# Patient Record
Sex: FEMALE | Race: WHITE | NOT HISPANIC OR LATINO | Employment: OTHER | URBAN - METROPOLITAN AREA
[De-identification: names, ages, dates, MRNs, and addresses within clinical notes are randomized per-mention and may not be internally consistent; named-entity substitution may affect disease eponyms.]

---

## 2021-08-04 ENCOUNTER — OFFICE VISIT (OUTPATIENT)
Dept: FAMILY MEDICINE | Facility: CLINIC | Age: 76
End: 2021-08-04
Payer: MEDICARE

## 2021-08-04 VITALS
BODY MASS INDEX: 37.92 KG/M2 | HEIGHT: 63 IN | RESPIRATION RATE: 19 BRPM | SYSTOLIC BLOOD PRESSURE: 146 MMHG | DIASTOLIC BLOOD PRESSURE: 92 MMHG | OXYGEN SATURATION: 95 % | WEIGHT: 214 LBS | HEART RATE: 76 BPM

## 2021-08-04 DIAGNOSIS — I25.10 CORONARY ARTERY DISEASE INVOLVING NATIVE HEART WITHOUT ANGINA PECTORIS, UNSPECIFIED VESSEL OR LESION TYPE: ICD-10-CM

## 2021-08-04 DIAGNOSIS — Z98.890 HX OF MELANOMA EXCISION: ICD-10-CM

## 2021-08-04 DIAGNOSIS — I50.30 HEART FAILURE WITH PRESERVED EJECTION FRACTION, NYHA CLASS IV (H): ICD-10-CM

## 2021-08-04 DIAGNOSIS — Z85.820 HX OF MELANOMA EXCISION: ICD-10-CM

## 2021-08-04 DIAGNOSIS — Z00.00 ENCOUNTER FOR MEDICARE ANNUAL WELLNESS EXAM: Primary | ICD-10-CM

## 2021-08-04 DIAGNOSIS — Z87.19 HISTORY OF DIVERTICULITIS: ICD-10-CM

## 2021-08-04 DIAGNOSIS — K59.09 CHRONIC CONSTIPATION: ICD-10-CM

## 2021-08-04 DIAGNOSIS — Z13.820 SCREENING FOR OSTEOPOROSIS: ICD-10-CM

## 2021-08-04 DIAGNOSIS — L40.9 PSORIASIS: ICD-10-CM

## 2021-08-04 DIAGNOSIS — N39.41 URGENCY INCONTINENCE: ICD-10-CM

## 2021-08-04 DIAGNOSIS — E66.01 MORBID OBESITY (H): ICD-10-CM

## 2021-08-04 DIAGNOSIS — R01.1 HEART MURMUR: ICD-10-CM

## 2021-08-04 DIAGNOSIS — E78.5 HYPERLIPIDEMIA LDL GOAL <100: ICD-10-CM

## 2021-08-04 DIAGNOSIS — Z12.31 ENCOUNTER FOR SCREENING MAMMOGRAM FOR BREAST CANCER: ICD-10-CM

## 2021-08-04 DIAGNOSIS — E03.9 ACQUIRED HYPOTHYROIDISM: ICD-10-CM

## 2021-08-04 DIAGNOSIS — R82.90 NONSPECIFIC FINDING ON EXAMINATION OF URINE: ICD-10-CM

## 2021-08-04 DIAGNOSIS — I10 HTN, GOAL BELOW 140/90: ICD-10-CM

## 2021-08-04 DIAGNOSIS — N28.89 LEFT RENAL MASS: ICD-10-CM

## 2021-08-04 LAB
ALBUMIN SERPL-MCNC: 3.8 G/DL (ref 3.4–5)
ALBUMIN UR-MCNC: NEGATIVE MG/DL
ALP SERPL-CCNC: 101 U/L (ref 40–150)
ALT SERPL W P-5'-P-CCNC: 37 U/L (ref 0–50)
ANION GAP SERPL CALCULATED.3IONS-SCNC: <1 MMOL/L (ref 3–14)
APPEARANCE UR: CLEAR
AST SERPL W P-5'-P-CCNC: 21 U/L (ref 0–45)
BILIRUB SERPL-MCNC: 0.5 MG/DL (ref 0.2–1.3)
BILIRUB UR QL STRIP: NEGATIVE
BUN SERPL-MCNC: 15 MG/DL (ref 7–30)
CALCIUM SERPL-MCNC: 9.4 MG/DL (ref 8.5–10.1)
CHLORIDE BLD-SCNC: 105 MMOL/L (ref 94–109)
CHOLEST SERPL-MCNC: 267 MG/DL
CO2 SERPL-SCNC: 34 MMOL/L (ref 20–32)
COLOR UR AUTO: YELLOW
CREAT SERPL-MCNC: 0.69 MG/DL (ref 0.52–1.04)
FASTING STATUS PATIENT QL REPORTED: YES
GFR SERPL CREATININE-BSD FRML MDRD: 84 ML/MIN/1.73M2
GLUCOSE BLD-MCNC: 112 MG/DL (ref 70–99)
GLUCOSE UR STRIP-MCNC: NEGATIVE MG/DL
HDLC SERPL-MCNC: 73 MG/DL
HGB UR QL STRIP: NEGATIVE
KETONES UR STRIP-MCNC: NEGATIVE MG/DL
LDLC SERPL CALC-MCNC: 171 MG/DL
LEUKOCYTE ESTERASE UR QL STRIP: ABNORMAL
NITRATE UR QL: NEGATIVE
NONHDLC SERPL-MCNC: 194 MG/DL
PH UR STRIP: 7 [PH] (ref 5–7)
POTASSIUM BLD-SCNC: 4.7 MMOL/L (ref 3.4–5.3)
PROT SERPL-MCNC: 7.7 G/DL (ref 6.8–8.8)
RBC #/AREA URNS AUTO: ABNORMAL /HPF
SODIUM SERPL-SCNC: 138 MMOL/L (ref 133–144)
SP GR UR STRIP: 1.01 (ref 1–1.03)
SQUAMOUS #/AREA URNS AUTO: ABNORMAL /LPF
TRIGL SERPL-MCNC: 113 MG/DL
TSH SERPL DL<=0.005 MIU/L-ACNC: 2.46 MU/L (ref 0.4–4)
UROBILINOGEN UR STRIP-ACNC: 0.2 E.U./DL
WBC #/AREA URNS AUTO: ABNORMAL /HPF

## 2021-08-04 PROCEDURE — 80061 LIPID PANEL: CPT | Performed by: FAMILY MEDICINE

## 2021-08-04 PROCEDURE — 80053 COMPREHEN METABOLIC PANEL: CPT | Performed by: FAMILY MEDICINE

## 2021-08-04 PROCEDURE — 84443 ASSAY THYROID STIM HORMONE: CPT | Performed by: FAMILY MEDICINE

## 2021-08-04 PROCEDURE — 99387 INIT PM E/M NEW PAT 65+ YRS: CPT | Performed by: FAMILY MEDICINE

## 2021-08-04 PROCEDURE — 36415 COLL VENOUS BLD VENIPUNCTURE: CPT | Performed by: FAMILY MEDICINE

## 2021-08-04 PROCEDURE — 87086 URINE CULTURE/COLONY COUNT: CPT | Performed by: FAMILY MEDICINE

## 2021-08-04 PROCEDURE — 81001 URINALYSIS AUTO W/SCOPE: CPT | Performed by: FAMILY MEDICINE

## 2021-08-04 RX ORDER — GABAPENTIN 300 MG/1
300 CAPSULE ORAL PRN
COMMUNITY
Start: 2020-03-25

## 2021-08-04 RX ORDER — ATORVASTATIN CALCIUM 40 MG/1
40 TABLET, FILM COATED ORAL DAILY
COMMUNITY
Start: 2021-05-03 | End: 2021-08-09

## 2021-08-04 RX ORDER — VIT C/E/CUPERIC/ZINC/LUTEIN 226-90-0.8
1 CAPSULE ORAL
COMMUNITY

## 2021-08-04 RX ORDER — METOPROLOL SUCCINATE 25 MG/1
25 TABLET, EXTENDED RELEASE ORAL DAILY
Qty: 90 TABLET | Refills: 1 | Status: SHIPPED | OUTPATIENT
Start: 2021-08-04 | End: 2022-12-07

## 2021-08-04 RX ORDER — NITROGLYCERIN 0.4 MG/1
TABLET SUBLINGUAL PRN
COMMUNITY
Start: 2020-03-30 | End: 2021-09-17

## 2021-08-04 RX ORDER — LEVOTHYROXINE SODIUM 50 UG/1
50 TABLET ORAL DAILY
COMMUNITY
Start: 2021-02-18 | End: 2021-10-07

## 2021-08-04 RX ORDER — SPIRONOLACTONE 25 MG
2 TABLET ORAL
COMMUNITY

## 2021-08-04 ASSESSMENT — ENCOUNTER SYMPTOMS
DIZZINESS: 0
DIARRHEA: 0
HEMATOCHEZIA: 0
ARTHRALGIAS: 1
CHILLS: 0
PARESTHESIAS: 1
DYSURIA: 0
CONSTIPATION: 0
FEVER: 0
HEARTBURN: 0
MYALGIAS: 1
SHORTNESS OF BREATH: 1
COUGH: 1
FREQUENCY: 0
PALPITATIONS: 0
SORE THROAT: 0
WEAKNESS: 1
NAUSEA: 0
BREAST MASS: 0
JOINT SWELLING: 0
EYE PAIN: 0
ABDOMINAL PAIN: 1
HEADACHES: 1
NERVOUS/ANXIOUS: 0
HEMATURIA: 0

## 2021-08-04 ASSESSMENT — PAIN SCALES - GENERAL: PAINLEVEL: SEVERE PAIN (6)

## 2021-08-04 ASSESSMENT — ACTIVITIES OF DAILY LIVING (ADL): CURRENT_FUNCTION: NO ASSISTANCE NEEDED

## 2021-08-04 ASSESSMENT — MIFFLIN-ST. JEOR: SCORE: 1422.83

## 2021-08-04 NOTE — PROGRESS NOTES
SUBJECTIVE:   Emerald Kulkarni is a 76 year old female who presents for Preventive Visit.  Patient has been advised of split billing requirements and indicates understanding: Yes   Are you in the first 12 months of your Medicare coverage?  No    HPI  Do you feel safe in your environment? Yes    Have you ever done Advance Care Planning? (For example, a Health Directive, POLST, or a discussion with a medical provider or your loved ones about your wishes): No, advance care planning information given to patient to review.  Patient declined advance care planning discussion at this time.   Concerns:  1. Heart: Regular nitro, indigestion, stent placed (6 yrs ago)  2. Eye doctor: found to have a high BP  3. HTN: might   Mass in upper jaw: been xrayed, pushing into Rt sinuses.  Incontinence: at the beginning to empty/urgeny x 6 mo    List of medical issues per patient scanned unto chart (  media tab)    Fall risk: 2 times, has neuropathy.  Back pain; uses to be 5'5' now 5' 3''. Has neck and shoulder and mid upper back,  hip replacements.     Living arrangement: Lives in house   Ambulation; Lot of walking uses 2 canes; otherwise has one can always, has walker as well. Due to Neurpathy  Vision: Has MD, uses glasses: follwing with eye  Hearing: Good  Bladder control: Incontinence, dribbles.jerome if does not have a BM for long.  Memory: good; forgets  ADLs: Damiánband helps, she does the cooking while seated.   Driving: Not driven in a year;  does most of it  Colonoscopy: had one last last year:    Breast cancer: mother, sister, uncle (bro to mother);   Hydrochlorothiazide 25  levo 50  lipitor 40  Lutein; 5mg  Ascorbic 500 mg twice daily  Asa 81  ntg omeprazole    Cognitive Screening   1) Repeat 3 items (Leader, Season, Table)    2) Clock draw: NORMAL  3) 3 item recall: Recalls 3 objects  Results: 3 items recalled: COGNITIVE IMPAIRMENT LESS LIKELY    Mini-CogTM Copyright S Shaquille. Licensed by the author for use in [facility]; reprinted with permission (soob@[facility]). All rights reserved.      Do you have sleep apnea, excessive snoring or daytime drowsiness?: no daytime drowsiness     Reviewed and updated as needed this visit by clinical staff  Tobacco  Allergies  Meds   Med Hx  Surg Hx  Fam Hx  Soc Hx        Reviewed and updated as needed this visit by Provider                Social History     Tobacco Use     Smoking status: Never Smoker     Smokeless tobacco: Never Used   Substance Use Topics     Alcohol use: Not on file     If you drink alcohol do you typically have >3 drinks per day or >7 drinks per week? No    Alcohol Use 8/4/2021   Prescreen: >3 drinks/day or >7 drinks/week? Not Applicable   Prescreen: >3 drinks/day or >7 drinks/week? -   No flowsheet data found.    PROBLEMS TO ADD ON...    1. HTN; uncontrolled on hydrochlorothiazide 25 mg. Not taking Metoprolol 25 mg 24 hr  2. Hypothyroidism: om Levothyroxine 50 mcg daily.  3. Hyperlipidemia: On Lipitor 40 mg daily.  4. CAD: on NTG prn; follows with cardiology  5. Macular Degeneration: following with ophthalmology  6.     Current providers sharing in care for this patient include:   Patient Care Team:  No Ref-Primary, Physician as PCP - General    The following health maintenance items are reviewed in Epic and correct as of today:  Health Maintenance Due   Topic Date Due     DEXA  Never done     ANNUAL REVIEW OF HM ORDERS  Never done     ADVANCE CARE PLANNING  Never done     COVID-19 Vaccine (1) Never done     HEPATITIS C SCREENING  Never done     DTAP/TDAP/TD IMMUNIZATION (1 - Tdap) Never done     LIPID  Never done     ZOSTER IMMUNIZATION (1 of 2) Never done     FALL RISK ASSESSMENT  Never done     Pneumococcal Vaccine: 65+ Years (1 of 1 - PPSV23) Never done     There is no problem list on file for this patient.    History reviewed. No pertinent surgical history.    Social History     Tobacco Use     Smoking status: Never Smoker     Smokeless tobacco: Never  "Used   Substance Use Topics     Alcohol use: Not on file     History reviewed. No pertinent family history.      Pneumonia Vaccine:Adults age 65+ who received Pneumovax (PPSV23) at 65 years or older: Should be given PCV13 > 1 year after their most recent PPSV23  Mammogram Screening: Mammogram Screening - Alternate mammogram schedule due to breast cancer history mother and brother    Mammogram Screening - Patient over age 75, has elected to continue with screening.  Pertinent mammograms are reviewed under the imaging tab.    Review of Systems  Constitutional, HEENT, cardiovascular, pulmonary, gi and gu systems are negative, except as otherwise noted.    OBJECTIVE:   BP (!) 171/87 (BP Location: Right arm, Cuff Size: Adult Large)   Pulse 76   Resp 19   Ht 1.605 m (5' 3.19\")   Wt 97.1 kg (214 lb)   SpO2 95%   BMI 37.68 kg/m   Estimated body mass index is 37.68 kg/m  as calculated from the following:    Height as of this encounter: 1.605 m (5' 3.19\").    Weight as of this encounter: 97.1 kg (214 lb).  Physical Exam  GENERAL APPEARANCE: healthy, alert and no distress  EYES: Eyes grossly normal to inspection, PERRL and conjunctivae and sclerae normal  HENT: ear canals and TM's normal, nose and mouth without ulcers or lesions, oropharynx clear and oral mucous membranes moist  NECK: no adenopathy, and thyroid normal to palpation  RESP: lungs clear to auscultation - no rales, rhonchi or wheezes  BREAST: Deferred  CV: regular rate and rhythm, systolic murmur, click or rub, no peripheral edema   ABDOMEN: soft, obese, nontender, no masses and bowel sounds normal  MS: slow unsteady gait  SKIN: no suspicious lesions or rashes  NEURO: Normal strength and tone, mentation intact and speech normal  PSYCH: mentation appears normal and affect normal/bright    Diagnostic Test Results:  Results for orders placed or performed in visit on 08/04/21   UA reflex to Microscopic - lab collect     Status: Abnormal   Result Value Ref Range "    Color Urine Yellow Colorless, Straw, Light Yellow, Yellow    Appearance Urine Clear Clear    Glucose Urine Negative Negative mg/dL    Bilirubin Urine Negative Negative    Ketones Urine Negative Negative mg/dL    Specific Gravity Urine 1.015 1.003 - 1.035    Blood Urine Negative Negative    pH Urine 7.0 5.0 - 7.0    Protein Albumin Urine Negative Negative mg/dL    Urobilinogen Urine 0.2 0.2, 1.0 E.U./dL    Nitrite Urine Negative Negative    Leukocyte Esterase Urine Small (A) Negative   TSH with free T4 reflex     Status: Normal   Result Value Ref Range    TSH 2.46 0.40 - 4.00 mU/L   Comprehensive metabolic panel (BMP + Alb, Alk Phos, ALT, AST, Total. Bili, TP)     Status: Abnormal   Result Value Ref Range    Sodium 138 133 - 144 mmol/L    Potassium 4.7 3.4 - 5.3 mmol/L    Chloride 105 94 - 109 mmol/L    Carbon Dioxide (CO2) 34 (H) 20 - 32 mmol/L    Anion Gap <1 (L) 3 - 14 mmol/L    Urea Nitrogen 15 7 - 30 mg/dL    Creatinine 0.69 0.52 - 1.04 mg/dL    Calcium 9.4 8.5 - 10.1 mg/dL    Glucose 112 (H) 70 - 99 mg/dL    Alkaline Phosphatase 101 40 - 150 U/L    AST 21 0 - 45 U/L    ALT 37 0 - 50 U/L    Protein Total 7.7 6.8 - 8.8 g/dL    Albumin 3.8 3.4 - 5.0 g/dL    Bilirubin Total 0.5 0.2 - 1.3 mg/dL    GFR Estimate 84 >60 mL/min/1.73m2   Lipid Profile (Chol, Trig, HDL, LDL calc)     Status: Abnormal   Result Value Ref Range    Cholesterol 267 (H) <200 mg/dL    Triglycerides 113 <150 mg/dL    Direct Measure HDL 73 >=50 mg/dL    LDL Cholesterol Calculated 171 (H) <=100 mg/dL    Non HDL Cholesterol 194 (H) <130 mg/dL    Patient Fasting > 8hrs? Yes    Urine Microscopic Exam     Status: Abnormal   Result Value Ref Range    RBC Urine 5-10 (A) 0-2 /HPF /HPF    WBC Urine 10-25 (A) 0-5 /HPF /HPF    Squamous Epithelials Urine Few (A) None Seen /LPF   Urine Culture     Status: None    Specimen: Urine, Midstream   Result Value Ref Range    Culture No Growth          ASSESSMENT / PLAN:   Emerald was seen today for  physical.    Diagnoses and all orders for this visit:    Encounter for Medicare annual wellness exam  -     *MA Screening Digital Bilateral; Future  -     DX Hip/Pelvis/Spine; Future    HTN, goal below 140/90     Not at goal. Not been taking Metoprolol; will restart and recheck BP in 1-2 weeks  -     Comprehensive metabolic panel (BMP + Alb, Alk Phos, ALT, AST, Total. Bili, TP); Future  -     metoprolol succinate ER (TOPROL-XL) 25 MG 24 hr tablet; Take 1 tablet (25 mg) by mouth daily  -     Comprehensive metabolic panel (BMP + Alb, Alk Phos, ALT, AST, Total. Bili, TP)    Hyperlipidemia LDL goal <100    Cholesterol still very high; plan to increase dose of Lipitor to 80 mg  -     Lipid Profile (Chol, Trig, HDL, LDL calc); Future  -     Lipid Profile (Chol, Trig, HDL, LDL calc)    Coronary artery disease involving native heart without angina pectoris, unspecified vessel or lesion type    Acquired hypothyroidism  -     TSH with free T4 reflex; Future  -     TSH with free T4 reflex    Urgency incontinence  -     UA reflex to Microscopic - lab collect; Future  -     UA reflex to Microscopic - lab collect  -     Urine Microscopic Exam    Screening for osteoporosis  -     DX Hip/Pelvis/Spine; Future    Encounter for screening mammogram for breast cancer  -     *MA Screening Digital Bilateral; Future  -     Comprehensive metabolic panel (BMP + Alb, Alk Phos, ALT, AST, Total. Bili, TP); Future  -     Comprehensive metabolic panel (BMP + Alb, Alk Phos, ALT, AST, Total. Bili, TP)    Psoriasis    Hx of melanoma excision    Chronic constipation    History of diverticulitis    Left renal mass       -  Imaging surveillance    Nonspecific finding on examination of urine  -     Urine Culture; Future  -     Urine Culture    Heart murmur: Benign ?AS  Heart failure with preserved ejection fraction, NYHA class IV (H)     -   Following with cardiology    Morbid obesity (H)    Patient has been advised of split billing requirements and  "indicates understanding: Yes  COUNSELING:  Reviewed preventive health counseling, as reflected in patient instructions       Regular exercise       Healthy diet/nutrition       Bladder control       Fall risk prevention       Osteoporosis prevention/bone health    Estimated body mass index is 37.68 kg/m  as calculated from the following:    Height as of this encounter: 1.605 m (5' 3.19\").    Weight as of this encounter: 97.1 kg (214 lb).    Weight management plan: Discussed healthy diet and exercise guidelines    She reports that she has never smoked. She has never used smokeless tobacco.      Appropriate preventive services were discussed with this patient, including applicable screening as appropriate for cardiovascular disease, diabetes, osteopenia/osteoporosis, and glaucoma.  As appropriate for age/gender, discussed screening for colorectal cancer, prostate cancer, breast cancer, and cervical cancer. Checklist reviewing preventive services available has been given to the patient.    Reviewed patients plan of care and provided an AVS. The Basic Care Plan (routine screening as documented in Health Maintenance) for Emerald meets the Care Plan requirement. This Care Plan has been established and reviewed with the Patient and spouse.    Counseling Resources:  ATP IV Guidelines  Pooled Cohorts Equation Calculator  Breast Cancer Risk Calculator  Breast Cancer: Medication to Reduce Risk  FRAX Risk Assessment  ICSI Preventive Guidelines  Dietary Guidelines for Americans, 2010  [employer]'s MyPlate  ASA Prophylaxis  Lung CA Screening    Chan Hanks MD  [facility]    Identified Health Risks:  "

## 2021-08-05 LAB — BACTERIA UR CULT: NO GROWTH

## 2021-08-06 ENCOUNTER — TELEPHONE (OUTPATIENT)
Dept: FAMILY MEDICINE | Facility: CLINIC | Age: 76
End: 2021-08-06

## 2021-08-06 NOTE — TELEPHONE ENCOUNTER
Patient returned call to Dr. Almonte about Lipid results  Patient okay with taking medication. Pharmacy pended.  Patient available at [phone]  Maria Eugenia Franco RN on 8/6/2021 at 4:56 PM

## 2021-08-08 PROBLEM — E66.01 MORBID OBESITY (H): Status: ACTIVE | Noted: 2021-08-08

## 2021-08-08 PROBLEM — I50.30 HEART FAILURE WITH PRESERVED EJECTION FRACTION, NYHA CLASS IV (H): Status: ACTIVE | Noted: 2021-08-08

## 2021-08-09 DIAGNOSIS — E78.5 HYPERLIPIDEMIA LDL GOAL <100: Primary | ICD-10-CM

## 2021-08-09 RX ORDER — ATORVASTATIN CALCIUM 40 MG/1
40 TABLET, FILM COATED ORAL DAILY
Qty: 90 TABLET | Refills: 1 | Status: SHIPPED | OUTPATIENT
Start: 2021-08-09 | End: 2021-11-26

## 2021-08-10 ENCOUNTER — MEDICAL CORRESPONDENCE (OUTPATIENT)
Dept: HEALTH INFORMATION MANAGEMENT | Facility: CLINIC | Age: 76
End: 2021-08-10
Payer: MEDICARE

## 2021-08-18 ENCOUNTER — ANCILLARY PROCEDURE (OUTPATIENT)
Dept: BONE DENSITY | Facility: CLINIC | Age: 76
End: 2021-08-18
Attending: FAMILY MEDICINE
Payer: MEDICARE

## 2021-08-18 ENCOUNTER — ANCILLARY PROCEDURE (OUTPATIENT)
Dept: MAMMOGRAPHY | Facility: CLINIC | Age: 76
End: 2021-08-18
Attending: FAMILY MEDICINE
Payer: MEDICARE

## 2021-08-18 DIAGNOSIS — Z78.0 ASYMPTOMATIC POSTMENOPAUSAL STATUS: ICD-10-CM

## 2021-08-18 DIAGNOSIS — Z00.00 ENCOUNTER FOR MEDICARE ANNUAL WELLNESS EXAM: ICD-10-CM

## 2021-08-18 DIAGNOSIS — Z12.31 ENCOUNTER FOR SCREENING MAMMOGRAM FOR BREAST CANCER: ICD-10-CM

## 2021-08-18 DIAGNOSIS — Z13.820 SCREENING FOR OSTEOPOROSIS: ICD-10-CM

## 2021-08-18 PROCEDURE — 77080 DXA BONE DENSITY AXIAL: CPT | Mod: XU | Performed by: INTERNAL MEDICINE

## 2021-08-18 PROCEDURE — 77067 SCR MAMMO BI INCL CAD: CPT | Mod: TC | Performed by: RADIOLOGY

## 2021-08-18 PROCEDURE — 77063 BREAST TOMOSYNTHESIS BI: CPT | Mod: TC | Performed by: RADIOLOGY

## 2021-08-18 PROCEDURE — 77081 DXA BONE DENSITY APPENDICULR: CPT | Performed by: INTERNAL MEDICINE

## 2021-08-19 ENCOUNTER — TRANSFERRED RECORDS (OUTPATIENT)
Dept: HEALTH INFORMATION MANAGEMENT | Facility: CLINIC | Age: 76
End: 2021-08-19

## 2021-08-24 PROBLEM — N28.1 COMPLEX RENAL CYST: Status: ACTIVE | Noted: 2021-08-24

## 2021-09-01 ENCOUNTER — OFFICE VISIT (OUTPATIENT)
Dept: FAMILY MEDICINE | Facility: CLINIC | Age: 76
End: 2021-09-01
Payer: MEDICARE

## 2021-09-01 VITALS
OXYGEN SATURATION: 94 % | HEART RATE: 90 BPM | TEMPERATURE: 98.3 F | DIASTOLIC BLOOD PRESSURE: 86 MMHG | BODY MASS INDEX: 36.57 KG/M2 | SYSTOLIC BLOOD PRESSURE: 142 MMHG | HEIGHT: 64 IN | WEIGHT: 214.2 LBS | RESPIRATION RATE: 17 BRPM

## 2021-09-01 DIAGNOSIS — R32 URINARY INCONTINENCE, UNSPECIFIED TYPE: ICD-10-CM

## 2021-09-01 DIAGNOSIS — I10 HTN, GOAL BELOW 140/90: Primary | ICD-10-CM

## 2021-09-01 DIAGNOSIS — M81.0 AGE RELATED OSTEOPOROSIS, UNSPECIFIED PATHOLOGICAL FRACTURE PRESENCE: ICD-10-CM

## 2021-09-01 PROCEDURE — 99214 OFFICE O/P EST MOD 30 MIN: CPT | Performed by: FAMILY MEDICINE

## 2021-09-01 ASSESSMENT — MIFFLIN-ST. JEOR: SCORE: 1429.98

## 2021-09-01 ASSESSMENT — PAIN SCALES - GENERAL: PAINLEVEL: NO PAIN (0)

## 2021-09-01 NOTE — PROGRESS NOTES
Assessment & Plan     HTN, goal below 140/90    BP borderline high, taking Metoprolol 25 mg and Lisinopril 10 mg. Discussed sodium restriction, maintaining ideal body weight and regular exercise program as physiologic means to achieve blood pressure control. Discussed adjusting medication; but she would like to lose some weight and see if that helps. Will send in BP reading and if persistently elevated will increase Lisinopril to 20, 30 or 40 mg till BP gets to gaol.    Age related osteoporosis, unspecified pathological fracture presence    -  Reviwed results from Dexa scan; which showed osteoporosis with severe ddd; discussed risk of fracture and treatments for osteoporosis; with fosamax or prolia; concerned with side effects would like to do Vit D and calcium supplements only at this time    Urinary incontinence, unspecified type    She is interested in pelvic floor exercises with PT and been told that this can help; referral for pelvic floor exercises given,.    - [facility]; Future    BMI 37.0-37.9, adult   -  Counseled to make better food choices, exercise as tolerated, and lose weight. 6}      Return in about 3 months (around 12/1/2021) for Wellness follow up.    Chan Hanks MD  [facility] GAGNA Corbin is a 76 year old who presents for the following health issues   HPI   Chief Complaint   Patient presents with     Follow Up     last seen 8/4/2021     Health Maintenance     Flu Shot, Fall Risk,  Zoster, Hepc Screening, Covid19 vaccine      Stomach upset:   After took a cookie with coffee; had bile coming up; but this has subsided    HTN:  BP borderline high.    Exercse and wt loss  BP Readings from Last 3 Encounters:   09/01/21 (!) 142/86   08/04/21 (!) 146/92     Bladder Dribbling: she was pescribed a medication.    Healthcare Maintenance:    Will follow up    Review of Systems   Constitutional, HEENT, cardiovascular, pulmonary and gi systems are  "negative, except as otherwise noted.      Objective    BP (!) 142/86   Pulse 90   Temp 98.3  F (36.8  C) (Oral)   Resp 17   Ht 1.615 m (5' 3.58\")   Wt 97.2 kg (214 lb 3.2 oz)   SpO2 94%   BMI 37.25 kg/m    Body mass index is 37.25 kg/m .  Physical Exam   GENERAL: healthy, alert and no distress  RESP: lungs clear to auscultation - no rales, rhonchi or wheezes  CV: regular rate and rhythm, no murmur, click or rub, no peripheral edema  MS: no gross musculoskeletal defects noted, no edema  PSYCH: mentation appears normal, affect normal/bright    "

## 2021-09-09 ENCOUNTER — TRANSFERRED RECORDS (OUTPATIENT)
Dept: HEALTH INFORMATION MANAGEMENT | Facility: CLINIC | Age: 76
End: 2021-09-09

## 2021-09-09 DIAGNOSIS — N28.89 RENAL MASS, LEFT: Primary | ICD-10-CM

## 2021-10-06 DIAGNOSIS — E03.9 ACQUIRED HYPOTHYROIDISM: Primary | ICD-10-CM

## 2021-10-07 RX ORDER — LEVOTHYROXINE SODIUM 50 UG/1
50 TABLET ORAL DAILY
Qty: 90 TABLET | Refills: 1 | Status: SHIPPED | OUTPATIENT
Start: 2021-10-07 | End: 2022-06-01

## 2021-11-23 DIAGNOSIS — E78.5 HYPERLIPIDEMIA LDL GOAL <100: ICD-10-CM

## 2021-11-26 RX ORDER — ATORVASTATIN CALCIUM 40 MG/1
40 TABLET, FILM COATED ORAL DAILY
Qty: 90 TABLET | Refills: 0 | Status: SHIPPED | OUTPATIENT
Start: 2021-11-26 | End: 2022-12-07

## 2022-05-31 DIAGNOSIS — E03.9 ACQUIRED HYPOTHYROIDISM: ICD-10-CM

## 2022-06-01 RX ORDER — LEVOTHYROXINE SODIUM 50 UG/1
TABLET ORAL
Qty: 90 TABLET | Refills: 0 | Status: SHIPPED | OUTPATIENT
Start: 2022-06-01 | End: 2022-08-23

## 2022-06-01 NOTE — TELEPHONE ENCOUNTER
"Requested Prescriptions   Signed Prescriptions Disp Refills    levothyroxine (SYNTHROID/LEVOTHROID) 50 MCG tablet 90 tablet 0     Sig: TAKE ONE TABLET BY MOUTH ONE TIME DAILY       Thyroid Protocol Passed - 5/31/2022  1:39 PM        Passed - Patient is 12 years or older        Passed - Recent (12 mo) or future (30 days) visit within the authorizing provider's specialty     Patient has had an office visit with the authorizing provider or a provider within the authorizing providers department within the previous 12 mos or has a future within next 30 days. See \"Patient Info\" tab in inbasket, or \"Choose Columns\" in Meds & Orders section of the refill encounter.              Passed - Medication is active on med list        Passed - Normal TSH on file in past 12 months     Recent Labs   Lab Test 08/04/21  1044   TSH 2.46              Passed - No active pregnancy on record     If patient is pregnant or has had a positive pregnancy test, please check TSH.          Passed - No positive pregnancy test in past 12 months     If patient is pregnant or has had a positive pregnancy test, please check TSH.             Thanks,  JORGE Cleaning  [facility]     "

## 2022-08-20 DIAGNOSIS — E03.9 ACQUIRED HYPOTHYROIDISM: ICD-10-CM

## 2022-08-23 RX ORDER — LEVOTHYROXINE SODIUM 50 UG/1
TABLET ORAL
Qty: 90 TABLET | Refills: 0 | Status: SHIPPED | OUTPATIENT
Start: 2022-08-23 | End: 2022-12-07

## 2022-08-23 NOTE — TELEPHONE ENCOUNTER
Medication levothyroxine (SYNTHROID/LEVOTHROID) is being filled for 1 time refill only due to:  Patient needs to be seen because it has been more than one year since last visit.  Please notify patient    Annamaria Velazco, RN, BSN

## 2022-08-26 NOTE — TELEPHONE ENCOUNTER
Called and spoke with patient. She was made aware of needing to scheduling for further refills. Declined to scheduled.    Vi Vasquez,      Approved Prescriptions     levothyroxine (SYNTHROID/LEVOTHROID) 50 MCG tablet         Sig: TAKE ONE TABLET BY MOUTH ONE TIME DAILY    Disp:  90 tablet    Refills:  0    Start: 8/23/2022    Class: E-Prescribe    Authorized by: Annamaria Velazco RN

## 2022-10-19 ENCOUNTER — DOCUMENTATION ONLY (OUTPATIENT)
Dept: LAB | Facility: CLINIC | Age: 77
End: 2022-10-19

## 2022-10-19 DIAGNOSIS — E03.9 ACQUIRED HYPOTHYROIDISM: Primary | ICD-10-CM

## 2022-10-20 ENCOUNTER — LAB (OUTPATIENT)
Dept: LAB | Facility: CLINIC | Age: 77
End: 2022-10-20
Payer: MEDICARE

## 2022-10-20 DIAGNOSIS — E03.9 ACQUIRED HYPOTHYROIDISM: ICD-10-CM

## 2022-10-20 LAB — TSH SERPL DL<=0.005 MIU/L-ACNC: 1.77 MU/L (ref 0.4–4)

## 2022-10-20 PROCEDURE — 84443 ASSAY THYROID STIM HORMONE: CPT

## 2022-10-20 PROCEDURE — 36415 COLL VENOUS BLD VENIPUNCTURE: CPT

## 2022-10-24 NOTE — PROGRESS NOTES
Called patient xavier yee answered and stated she is not up yet and he would leave a message to have her call back.   Thank you   L S

## 2022-10-27 ENCOUNTER — ANCILLARY PROCEDURE (OUTPATIENT)
Dept: MAMMOGRAPHY | Facility: CLINIC | Age: 77
End: 2022-10-27
Attending: FAMILY MEDICINE
Payer: MEDICARE

## 2022-10-27 DIAGNOSIS — Z12.31 VISIT FOR SCREENING MAMMOGRAM: ICD-10-CM

## 2022-10-27 PROCEDURE — 77067 SCR MAMMO BI INCL CAD: CPT | Mod: TC | Performed by: RADIOLOGY

## 2022-10-27 PROCEDURE — 77063 BREAST TOMOSYNTHESIS BI: CPT | Mod: TC | Performed by: RADIOLOGY

## 2022-12-07 ENCOUNTER — OFFICE VISIT (OUTPATIENT)
Dept: FAMILY MEDICINE | Facility: CLINIC | Age: 77
End: 2022-12-07
Payer: MEDICARE

## 2022-12-07 VITALS
TEMPERATURE: 98.8 F | BODY MASS INDEX: 37.53 KG/M2 | SYSTOLIC BLOOD PRESSURE: 160 MMHG | WEIGHT: 211.8 LBS | HEART RATE: 87 BPM | DIASTOLIC BLOOD PRESSURE: 98 MMHG | OXYGEN SATURATION: 97 % | HEIGHT: 63 IN

## 2022-12-07 DIAGNOSIS — E78.5 HYPERLIPIDEMIA LDL GOAL <100: ICD-10-CM

## 2022-12-07 DIAGNOSIS — L84 CALLUS OF FOOT: ICD-10-CM

## 2022-12-07 DIAGNOSIS — M20.41 HAMMER TOES, BILATERAL: ICD-10-CM

## 2022-12-07 DIAGNOSIS — E03.9 ACQUIRED HYPOTHYROIDISM: ICD-10-CM

## 2022-12-07 DIAGNOSIS — Z00.00 ENCOUNTER FOR MEDICARE ANNUAL WELLNESS EXAM: Primary | ICD-10-CM

## 2022-12-07 DIAGNOSIS — M20.42 HAMMER TOES, BILATERAL: ICD-10-CM

## 2022-12-07 DIAGNOSIS — I10 HTN, GOAL BELOW 140/90: ICD-10-CM

## 2022-12-07 LAB
ALBUMIN SERPL-MCNC: 3.8 G/DL (ref 3.4–5)
ALP SERPL-CCNC: 94 U/L (ref 40–150)
ALT SERPL W P-5'-P-CCNC: 33 U/L (ref 0–50)
ANION GAP SERPL CALCULATED.3IONS-SCNC: 4 MMOL/L (ref 3–14)
AST SERPL W P-5'-P-CCNC: 19 U/L (ref 0–45)
BILIRUB SERPL-MCNC: 0.4 MG/DL (ref 0.2–1.3)
BUN SERPL-MCNC: 13 MG/DL (ref 7–30)
CALCIUM SERPL-MCNC: 9.7 MG/DL (ref 8.5–10.1)
CHLORIDE BLD-SCNC: 103 MMOL/L (ref 94–109)
CHOLEST SERPL-MCNC: 239 MG/DL
CO2 SERPL-SCNC: 30 MMOL/L (ref 20–32)
CREAT SERPL-MCNC: 0.62 MG/DL (ref 0.52–1.04)
FASTING STATUS PATIENT QL REPORTED: YES
GFR SERPL CREATININE-BSD FRML MDRD: 90 ML/MIN/1.73M2
GLUCOSE BLD-MCNC: 113 MG/DL (ref 70–99)
HDLC SERPL-MCNC: 87 MG/DL
LDLC SERPL CALC-MCNC: 128 MG/DL
NONHDLC SERPL-MCNC: 152 MG/DL
POTASSIUM BLD-SCNC: 4.2 MMOL/L (ref 3.4–5.3)
PROT SERPL-MCNC: 8 G/DL (ref 6.8–8.8)
SODIUM SERPL-SCNC: 137 MMOL/L (ref 133–144)
TRIGL SERPL-MCNC: 118 MG/DL

## 2022-12-07 PROCEDURE — 80053 COMPREHEN METABOLIC PANEL: CPT | Performed by: FAMILY MEDICINE

## 2022-12-07 PROCEDURE — 99213 OFFICE O/P EST LOW 20 MIN: CPT | Mod: 25 | Performed by: FAMILY MEDICINE

## 2022-12-07 PROCEDURE — G0439 PPPS, SUBSEQ VISIT: HCPCS | Performed by: FAMILY MEDICINE

## 2022-12-07 PROCEDURE — 36415 COLL VENOUS BLD VENIPUNCTURE: CPT | Performed by: FAMILY MEDICINE

## 2022-12-07 PROCEDURE — 80061 LIPID PANEL: CPT | Performed by: FAMILY MEDICINE

## 2022-12-07 RX ORDER — ATORVASTATIN CALCIUM 40 MG/1
40 TABLET, FILM COATED ORAL DAILY
Qty: 90 TABLET | Refills: 3 | Status: SHIPPED | OUTPATIENT
Start: 2022-12-07 | End: 2023-07-17

## 2022-12-07 RX ORDER — LISINOPRIL 10 MG/1
10 TABLET ORAL DAILY
Qty: 90 TABLET | Refills: 1 | COMMUNITY
Start: 2022-12-07 | End: 2023-07-17

## 2022-12-07 RX ORDER — METOPROLOL SUCCINATE 25 MG/1
25 TABLET, EXTENDED RELEASE ORAL DAILY
Qty: 90 TABLET | Refills: 1 | COMMUNITY
Start: 2022-12-07 | End: 2023-07-17

## 2022-12-07 RX ORDER — LEVOTHYROXINE SODIUM 50 UG/1
50 TABLET ORAL DAILY
Qty: 90 TABLET | Refills: 3 | Status: SHIPPED | OUTPATIENT
Start: 2022-12-07 | End: 2024-02-07

## 2022-12-07 ASSESSMENT — ACTIVITIES OF DAILY LIVING (ADL): CURRENT_FUNCTION: NO ASSISTANCE NEEDED

## 2022-12-07 NOTE — PATIENT INSTRUCTIONS
Patient Education   Personalized Prevention Plan  You are due for the preventive services outlined below.  Your care team is available to assist you in scheduling these services.  If you have already completed any of these items, please share that information with your care team to update in your medical record.  Health Maintenance Due   Topic Date Due     ANNUAL REVIEW OF HM ORDERS  Never done     COVID-19 Vaccine (1) Never done     Hepatitis C Screening  Never done     Zoster (Shingles) Vaccine (1 of 2) Never done     Annual Wellness Visit  08/04/2022     Flu Vaccine (1) 09/01/2022       Exercise for a Healthier Heart  You may wonder how you can improve the health of your heart. If you re thinking about exercise, you re on the right track. You don t need to become an athlete. But you do need a certain amount of brisk exercise to help strengthen your heart. If you have been diagnosed with a heart condition, your healthcare provider may advise exercise to help stabilize your condition. To help make exercise a habit, choose safe, fun activities.      Exercise with a friend. When activity is fun, you're more likely to stick with it.   Before you start  Check with your healthcare provider before starting an exercise program. This is especially important if you have not been active for a while. It's also important if you have a long-term (chronic) health problem such as heart disease, diabetes, or obesity. Or if you are at high risk for having these problems.   Why exercise?  Exercising regularly offers many healthy rewards. It can help you do all of the following:     Improve your blood cholesterol level to help prevent further heart trouble    Lower your blood pressure to help prevent a stroke or heart attack    Control diabetes, or reduce your risk of getting this disease    Improve your heart and lung function    Reach and stay at a healthy weight    Make your muscles stronger so you can stay active    Prevent  falls and fractures by slowing the loss of bone mass (osteoporosis)    Manage stress better    Reduce your blood pressure    Improve your sense of self and your body image  Exercise tips      Ease into your routine. Set small goals. Then build on them. If you are not sure what your activity level should be, talk with your healthcare provider first before starting an exercise routine.    Exercise on most days. Aim for a total of 150 minutes (2 hours and 30 minutes) or more of moderate-intensity aerobic activity each week. Or 75 minutes (1 hour and 15 minutes) or more of vigorous-intensity aerobic activity each week. Or try for a combination of both. Moderate activity means that you breathe heavier and your heart rate increases but you can still talk. Think about doing 40 minutes of moderate exercise, 3 to 4 times a week. For best results, activity should last for about 40 minutes to lower blood pressure and cholesterol. It's OK to work up to the 40-minute period over time. Examples of moderate-intensity activity are walking 1 mile in 15 minutes. Or doing 30 to 45 minutes of yard work.    Step up your daily activity level.  Along with your exercise program, try being more active the whole day. Walk instead of drive. Or park further away so that you take more steps each day. Do more household tasks or yard work. You may not be able to meet the advised mount of physical activity. But doing some moderate- or vigorous-intensity aerobic activity can help reduce your risk for heart disease. Your healthcare provider can help you figure out what is best for you.    Choose 1 or more activities you enjoy.  Walking is one of the easiest things you can do. You can also try swimming, riding a bike, dancing, or taking an exercise class.    When to call your healthcare provider  Call your healthcare provider if you have any of these:     Chest pain or feel dizzy or lightheaded    Burning, tightness, pressure, or heaviness in your  chest, neck, shoulders, back, or arms    Abnormal shortness of breath    More joint or muscle pain    A very fast or irregular heartbeat (palpitations)  [employer] last reviewed this educational content on 7/1/2019 2000-2021 [employer]. All rights reserved. This information is not intended as a substitute for professional medical care. Always follow your healthcare professional's instructions.          Understanding USDA MyPlate  The USDA has guidelines to help you make healthy food choices. These are called MyPlate. MyPlate shows the food groups that make up healthy meals using the image of a place setting. Before you eat, think about the healthiest choices for what to put on your plate or in your cup or bowl. To learn more about building a healthy plate, visit www.choosemyplate.gov.    The food groups    Fruits. Any fruit or 100% fruit juice counts as part of the Fruit Group. Fruits may be fresh, canned, frozen, or dried, and may be whole, cut-up, or pureed. Make 1/2 of your plate fruits and vegetables.    Vegetables. Any vegetable or 100% vegetable juice counts as a member of the Vegetable Group. Vegetables may be fresh, frozen, canned, or dried. They can be served raw or cooked and may be whole, cut-up, or mashed. Make 1/2 of your plate fruits and vegetables.    Grains. All foods made from grains are part of the Grains Group. These include wheat, rice, oats, cornmeal, and barley. Grains are often used to make foods such as bread, pasta, oatmeal, cereal, tortillas, and grits. Grains should be no more than 1/4 of your plate. At least half of your grains should be whole grains.    Protein. This group includes meat, poultry, seafood, beans and peas, eggs, processed soy products (such as tofu), nuts (including nut butters), and seeds. Make protein choices no more than 1/4 of your plate. Meat and poultry choices should be lean or low fat.    Dairy. The Dairy Group includes all fluid milk products and  foods made from milk that contain calcium, such as yogurt and cheese. (Foods that have little calcium, such as cream, butter, and cream cheese, are not part of this group.) Most dairy choices should be low-fat or fat-free.    Oils. Oils aren't a food group, but they do contain essential nutrients. However it's important to watch your intake of oils. These are fats that are liquid at room temperature. They include canola, corn, olive, soybean, vegetable, and sunflower oil. Foods that are mainly oil include mayonnaise, certain salad dressings, and soft margarines. You likely already get your daily oil allowance from the foods you eat.  Things to limit  Eating healthy also means limiting these things in your diet:       Salt (sodium). Many processed foods have a lot of sodium. To keep sodium intake down, eat fresh vegetables, meats, poultry, and seafood when possible. Purchase low-sodium, reduced-sodium, or no-salt-added food products at the store. And don't add salt to your meals at home. Instead, season them with herbs and spices such as dill, oregano, cumin, and paprika. Or try adding flavor with lemon or lime zest and juice.    Saturated fat. Saturated fats are most often found in animal products such as beef, pork, and chicken. They are often solid at room temperature, such as butter. To reduce your saturated fat intake, choose leaner cuts of meat and poultry. And try healthier cooking methods such as grilling, broiling, roasting, or baking. For a simple lower-fat swap, use plain nonfat yogurt instead of mayonnaise when making potato salad or macaroni salad.    Added sugars. These are sugars added to foods. They are in foods such as ice cream, candy, soda, fruit drinks, sports drinks, energy drinks, cookies, pastries, jams, and syrups. Cut down on added sugars by sharing sweet treats with a family member or friend. You can also choose fruit for dessert, and drink water or other unsweetened beverages.     [employer]Well  last reviewed this educational content on 6/1/2020 2000-2021 [employer]. All rights reserved. This information is not intended as a substitute for professional medical care. Always follow your healthcare professional's instructions.          Urinary Incontinence, Female (Adult)   Urinary incontinence means loss of bladder control. This problem affects many women, especially as they get older. If you have incontinence, you may be embarrassed to ask for help. But know that this problem can be treated.   Types of Incontinence  There are different types of incontinence. Two of the main types are described here. You can have more than one type.     Stress incontinence. With this type, urine leaks when pressure (stress) is put on the bladder. This may happen when you cough, sneeze, or laugh. Stress incontinence most often occurs because the pelvic floor muscles that support the bladder and urethra are weak. This can happen after pregnancy and vaginal childbirth or a hysterectomy. It can also be due to excess body weight or hormone changes.    Urge incontinence (also called overactive bladder). With this type, a sudden urge to urinate is felt often. This may happen even though there may not be much urine in the bladder. The need to urinate often during the night is common. Urge incontinence most often occurs because of bladder spasms. This may be due to bladder irritation or infection. Damage to bladder nerves or pelvic muscles, constipation, and certain medicines can also lead to urge incontinence.  Treatment depends on the cause. Further evaluation is needed to find the type you have. This will likely include an exam and certain tests. Based on the results, you and your healthcare provider can then plan treatment. Until a diagnosis is made, the home care tips below can help ease symptoms.   Home care    Do pelvic floor muscle exercises, if they are prescribed. The pelvic floor muscles help support the  bladder and urethra. Many women find that their symptoms improve when doing special exercises that strengthen these muscles. To do the exercises, contract the muscles you would use to stop your stream of urine. But do this when you re not urinating. Hold for 10 seconds, then relax. Repeat 10 to 20 times in a row, at least 3 times a day. Your healthcare provider may give you other instructions for how to do the exercises and how often.    Keep a bladder diary. This helps track how often and how much you urinate over a set period of time. Bring this diary with you to your next visit with the provider. The information can help your provider learn more about your bladder problem.    Lose weight, if advised to by your provider. Extra weight puts pressure on the bladder. Your provider can help you create a weight-loss plan that s right for you. This may include exercising more and making certain diet changes.    Don't have foods and drinks that may irritate the bladder. These can include alcohol and caffeinated drinks.    Quit smoking. Smoking and other tobacco use can lead to a long-term (chronic) cough that strains the pelvic floor muscles. Smoking may also damage the bladder and urethra. Talk with your provider about treatments or methods you can use to quit smoking.    If drinking large amounts of fluid makes you have symptoms, you may be advised to limit your fluid intake. You may also be advised to drink most of your fluids during the day and to limit fluids at night.    If you re worried about urine leakage or accidents, you may wear absorbent pads to catch urine. Change the pads often. This helps reduce discomfort. It may also reduce the risk of skin or bladder infections.    Follow-up care  Follow up with your healthcare provider, or as directed. It may take some to find the right treatment for your problem. But healthy lifestyle changes can be made right away. These include such things as exercising on a regular  basis, eating a healthy diet, losing weight (if needed), and quitting smoking. Your treatment plan may include special therapies or medicines. Certain procedures or surgery may also be options. Talk about any questions you have with your provider.   When to seek medical advice  Call the healthcare provider right away if any of these occur:    Fever of 100.4 F (38 C) or higher, or as directed by your provider    Bladder pain or fullness    Belly swelling    Nausea or vomiting    Back pain    Weakness, dizziness, or fainting  Stephanie last reviewed this educational content on 1/1/2020 2000-2021 [employer]. All rights reserved. This information is not intended as a substitute for professional medical care. Always follow your healthcare professional's instructions.

## 2022-12-07 NOTE — PROGRESS NOTES
"SUBJECTIVE:   Emerald is a 77 year old who presents for Preventive Visit.    Are you in the first 12 months of your Medicare coverage?  No    Healthy Habits:    In general, how would you rate your overall health?  Very good    Frequency of exercise:  None    Do you usually eat at least 4 servings of fruit and vegetables a day, include whole grains    & fiber and avoid regularly eating high fat or \"junk\" foods?  No    Taking medications regularly:  Yes    Barriers to taking medications:  None    Medication side effects:  None    Ability to successfully perform activities of daily living:  No assistance needed    Home Safety:  No safety concerns identified    Hearing Impairment:  No hearing concerns    In the past 6 months, have you been bothered by leaking of urine? Yes    In general, how would you rate your overall mental or emotional health?  Very good      PHQ-2 Total Score:    Additional concerns today:  Yes    Concerns:  Callus Rt foot:  ? Neuropathy feet    Psoriasis: scalp and hands    HTN/Elevated BP    Has Metoprolol and is afraid of it; does not like medication. Also given Lisinopril but not taking.    Had other prescribed by heart doctor but does not take them.    Has appointment with cardiology on 12/20/2022 for a Stress test at [facility] due to occasional tightness    Complex renal cyst     -  She is following with urology.    HPLD:  Out of Lipitor and needs refill    Leaky Bladder:    Given medications but not taking.    Shots:   Flu shot, Covid, shingles: does not want them.    Have you ever done Advance Care Planning? (For example, a Health Directive, POLST, or a discussion with a medical provider or your loved ones about your wishes): No, advance care planning information given to patient to review.  Patient declined advance care planning discussion at this time.     Fall risk  Fallen 2 or more times in the past year?: No  Any fall with injury in the past year?: No    Cognitive Screening   1) Repeat 3 items " "(Leader, Season, Table)    2) Clock draw: NORMAL  3) 3 item recall: Recalls 3 objects  Results: 3 items recalled: COGNITIVE IMPAIRMENT LESS LIKELY    Mini-CogTM Copyright HELGA Corbin. Licensed by the author for use in [facility]; reprinted with permission ([email or web address]). All rights reserved.      Do you have sleep apnea, excessive snoring or daytime drowsiness?: yes    Reviewed and updated as needed this visit by clinical staff   Tobacco  Allergies               Reviewed and updated as needed this visit by Provider                 Social History     Tobacco Use     Smoking status: Never     Smokeless tobacco: Never   Substance Use Topics     Alcohol use: Not on file     If you drink alcohol do you typically have >3 drinks per day or >7 drinks per week?   Alcohol Use 12/7/2022   Prescreen: >3 drinks/day or >7 drinks/week? -   Prescreen: >3 drinks/day or >7 drinks/week? Not Applicable   No flowsheet data found.    There are several components   of CGA that should be evaluated, including   Functional capacity: good  Fall risk: none  Cognition: intact  Mood: good  Polypharmacy: no, reluctant to take medications  Urinary incontinence: yes  Sexual function\" N/A  Living situation: Duplex with   Social support: good  Financial concerns: None  Advance care/Goals of care: undecided     Current providers sharing in care for this patient include:   Patient Care Team:  Chan Hanks MD as PCP - General (Family Medicine)  Chan Hanks MD as Assigned PCP    The following health maintenance items are reviewed in Epic and correct as of today:  Health Maintenance   Topic Date Due     ANNUAL REVIEW OF HM ORDERS  Never done     COVID-19 Vaccine (1) Never done     HEPATITIS C SCREENING  Never done     ZOSTER IMMUNIZATION (1 of 2) Never done     MEDICARE ANNUAL WELLNESS VISIT  08/04/2022     INFLUENZA VACCINE (1) 09/01/2022     FALL RISK ASSESSMENT  12/07/2023     LIPID  08/04/2026     ADVANCE CARE " "PLANNING  08/08/2026     DTAP/TDAP/TD IMMUNIZATION (4 - Td or Tdap) 03/30/2027     DEXA  08/18/2036     PHQ-2 (once per calendar year)  Completed     Pneumococcal Vaccine: 65+ Years  Completed     IPV IMMUNIZATION  Aged Out     MENINGITIS IMMUNIZATION  Aged Out     Patient Active Problem List   Diagnosis     Psoriasis     Hx of melanoma excision     Chronic constipation     History of diverticulitis     Left renal mass     Heart murmur: Benign ?AS     Heart failure with preserved ejection fraction, NYHA class IV (H)     Morbid obesity (H)     Complex renal cyst     No past surgical history on file.    Social History     Tobacco Use     Smoking status: Never     Smokeless tobacco: Never   Substance Use Topics     Alcohol use: Not on file     Family History   Problem Relation Age of Onset     Breast Cancer Mother      Breast Cancer Brother          Mammogram Screening - Patient over age 75, has elected to continue with screening.  Pertinent mammograms are reviewed under the imaging tab.    Review of Systems  Constitutional, HEENT, cardiovascular, pulmonary, gi and gu systems are negative, except as otherwise noted.    OBJECTIVE:   BP (!) 178/113 (BP Location: Right arm, Patient Position: Sitting, Cuff Size: Adult Large)   Pulse 87   Temp 98.8  F (37.1  C) (Oral)   Ht 1.6 m (5' 3\")   Wt 96.1 kg (211 lb 12.8 oz)   SpO2 97%   BMI 37.52 kg/m   Estimated body mass index is 37.52 kg/m  as calculated from the following:    Height as of this encounter: 1.6 m (5' 3\").    Weight as of this encounter: 96.1 kg (211 lb 12.8 oz).  Physical Exam  GENERAL APPEARANCE: healthy, alert and no distress  EYES: Eyes grossly normal to inspection, PERRL and conjunctivae and sclerae normal  HENT: ear canals and TM's normal, oropharynx clear and oral mucous membranes moist  NECK: no adenopathy and thyroid normal to palpation  RESP: lungs clear to auscultation - no rales, rhonchi or wheezes  BREAST: deferred  CV: regular rate and rhythm,  " no murmur, click or rub, no peripheral edema   ABDOMEN: soft, nontender, no masses and bowel sounds normal  MS: no musculoskeletal defects are noted and gait is age appropriate without ataxia  SKIN: no suspicious lesions or rashes  NEURO: Normal strength and tone, mentation intact and speech normal  PSYCH: mentation appears normal and affect normal/bright    Diagnostic Test Results:  Labs reviewed in Epic    ASSESSMENT / PLAN:   Emerald was seen today for medicare visit.    Diagnoses and all orders for this visit:    Encounter for Medicare annual wellness exam        -     Lipid Profile (Chol, Trig, HDL, LDL calc); Future        -     Comprehensive metabolic panel (BMP + Alb, Alk Phos, ALT, AST, Total. Bili, TP)    HTN, goal below 140/90   Patient has BP medications but not taking. Discussed long term complications of untreated htn; she will start taking her medications and recheck BP at home and send some numbers in 1-2 weeks  -     Comprehensive metabolic panel (BMP + Alb, Alk Phos, ALT, AST, Total. Bili, TP); Future  -     Comprehensive metabolic panel (BMP + Alb, Alk Phos, ALT, AST, Total. Bili, TP)    Acquired hypothyroidism       Needs refills  -     levothyroxine (SYNTHROID/LEVOTHROID) 50 MCG tablet; Take 1 tablet (50 mcg) by mouth daily    Hyperlipidemia LDL goal <100  -     Lipid Profile (Chol, Trig, HDL, LDL calc); Future  -     atorvastatin (LIPITOR) 40 MG tablet; Take 1 tablet (40 mg) by mouth daily  -     Lipid Profile (Chol, Trig, HDL, LDL calc)    BMI 37.0-37.9, adult        -    Counseled to make better food choices, exercise as tolerated, and lose weight.     Callus of foot: Rt  -     Orthopedic  Referral; Future    Hammer toes, bilateral  -     Orthopedic  Referral; Future    Patient has been advised of split billing requirements and indicates understanding: Yes      COUNSELING:  Reviewed preventive health counseling, as reflected in patient instructions       Regular exercise        Healthy diet/nutrition       Bladder control       Fall risk prevention       Immunizations    Declined: Covid-19, Influenza and Zoster due to Concerns about side effects/safety             Osteoporosis prevention/bone health    She reports that she has never smoked. She has never used smokeless tobacco.      Appropriate preventive services were discussed with this patient, including applicable screening as appropriate for cardiovascular disease, diabetes, osteopenia/osteoporosis, and glaucoma.  As appropriate for age/gender, discussed screening for colorectal cancer, prostate cancer, breast cancer, and cervical cancer. Checklist reviewing preventive services available has been given to the patient.    Reviewed patients plan of care and provided an AVS. The Basic Care Plan (routine screening as documented in Health Maintenance) for Emerald meets the Care Plan requirement. This Care Plan has been established and reviewed with the Patient.          Chan Hanks MD  [facility]    Identified Health Risks:    She is at risk for lack of exercise and has been provided with information to increase physical activity for the benefit of her well-being.  The patient was counseled and encouraged to consider modifying their diet and eating habits. She was provided with information on recommended healthy diet options.  Information on urinary incontinence and treatment options given to patient.

## 2022-12-07 NOTE — LETTER
December 12, 2022    Emerald Kulkarni  [address] [place] [id number]          Dear ,    We are writing to inform you of your test results.  Your results show above desirable cholesterol levels; the 10-year ASCVD risk score (Quoc TROY Jr., et al., 2013) is: 46.6% (that is your risk of heart disease in the next 10 years). The recommended interventions include healthy diet, regular exercise, maintaining an ideal weight,start a cholesterol lowering medication and rechecking in 6-12 months. Your blood sugar is in the prediabetes range (100 - 125 if fasting) and the same interventions will help.     Let me know if your have any questions or concerns and/or your thoughts about a cholesterol lowering pill.    ASCVD Risk interpretation 2018 (the risk score guides necessary interventions).     ASCVD score 7.5% or more: Have statin discussion (Cholesterol Treatment Guidelines).     ASCVD >10% plus 1 risk factor: start statin. Risk 7.5 to less 10% selectively start statin (USPSTF))     Resulted Orders   Comprehensive metabolic panel (BMP + Alb, Alk Phos, ALT, AST, Total. Bili, TP)   Result Value Ref Range    Sodium 137 133 - 144 mmol/L    Potassium 4.2 3.4 - 5.3 mmol/L    Chloride 103 94 - 109 mmol/L    Carbon Dioxide (CO2) 30 20 - 32 mmol/L    Anion Gap 4 3 - 14 mmol/L    Urea Nitrogen 13 7 - 30 mg/dL    Creatinine 0.62 0.52 - 1.04 mg/dL    Calcium 9.7 8.5 - 10.1 mg/dL    Glucose 113 (H) 70 - 99 mg/dL    Alkaline Phosphatase 94 40 - 150 U/L    AST 19 0 - 45 U/L    ALT 33 0 - 50 U/L    Protein Total 8.0 6.8 - 8.8 g/dL    Albumin 3.8 3.4 - 5.0 g/dL    Bilirubin Total 0.4 0.2 - 1.3 mg/dL    GFR Estimate 90 >60 mL/min/1.73m2      Comment:      Effective December 21, 2021 eGFRcr in adults is calculated using the 2021 CKD-EPI creatinine equation which includes age and gender (Benjamin le al., NEJM, DOI: 10.1056/FWAOzo5343610)   Lipid Profile (Chol, Trig, HDL, LDL calc)   Result Value Ref Range    Cholesterol 239  (H) <200 mg/dL    Triglycerides 118 <150 mg/dL    Direct Measure HDL 87 >=50 mg/dL    LDL Cholesterol Calculated 128 (H) <=100 mg/dL    Non HDL Cholesterol 152 (H) <130 mg/dL    Patient Fasting > 8hrs? Yes     Narrative    Cholesterol  Desirable:  <200 mg/dL    Triglycerides  Normal:  Less than 150 mg/dL  Borderline High:  150-199 mg/dL  High:  200-499 mg/dL  Very High:  Greater than or equal to 500 mg/dL    Direct Measure HDL  Female:  Greater than or equal to 50 mg/dL   Male:  Greater than or equal to 40 mg/dL    LDL Cholesterol  Desirable:  <100mg/dL  Above Desirable:  100-129 mg/dL   Borderline High:  130-159 mg/dL   High:  160-189 mg/dL   Very High:  >= 190 mg/dL    Non HDL Cholesterol  Desirable:  130 mg/dL  Above Desirable:  130-159 mg/dL  Borderline High:  160-189 mg/dL  High:  190-219 mg/dL  Very High:  Greater than or equal to 220 mg/dL       If you have any questions or concerns, please call the clinic at the number listed above.       Sincerely,      Chan Hanks MD

## 2023-01-08 ENCOUNTER — NURSE TRIAGE (OUTPATIENT)
Dept: NURSING | Facility: CLINIC | Age: 78
End: 2023-01-08

## 2023-01-08 DIAGNOSIS — H57.13 PAIN OF BOTH EYES: ICD-10-CM

## 2023-01-08 DIAGNOSIS — R09.89 SINUS COMPLAINT: Primary | ICD-10-CM

## 2023-01-08 NOTE — TELEPHONE ENCOUNTER
"Nurse Triage SBAR    Is this a 2nd Level Triage? NO    Situation: Pt called wanting a to know what happened with her orthopedic referral and also requesting an ENT referral.     Background: Emerald was ordered a orthopedic referral about a month ago. She states she has not heard from them. She also states she has been having severe eye pain and spoke to her eye doctor who told her to get an ENT consult from her PCP.    Assessment: Emerald states her eye pain began yesterday. She states she had a 'scan' done about a year ago on her sinuses which detected a growth in her sinus. She believes the growths have gotten worse and are 'caving in' on her eye. Eye pain is rated 7-8/10. She denies redness, swelling, and drainage. No new visual changes. She is also c/o a headache rated 6/10 which started last night. Tylenol was taken with little effect.     Protocol Recommended Disposition:   See HCP Within 4 Hours (Or PCP Triage)    Recommendation: [facility] orthopedic  number was given to Emerald to contact tomorrow. Dispo to be seen within 4 hours or PCP triage. Pt refused both. Please review above information and contact pt at your earliest convenience to discuss POC. Pt is requesting PCP contact her directly.     Reason for Disposition    MODERATE eye pain or discomfort (e.g., interferes with normal activities or awakens from sleep; more than mild)    [1] MODERATE headache (e.g., interferes with normal activities) AND [2] present > 24 hours AND [3] unexplained  (Exceptions: analgesics not tried, typical migraine, or headache part of viral illness)    Additional Information    Negative: [1] Eyelids are very swollen (shut or almost) AND [2] fever    Negative: SEVERE eye pain    Negative: [1] Eyelid (outer) is very red AND [2] fever    Negative: Complete loss of vision in one or both eyes    Negative: Patient sounds very sick or weak to the triager    Negative: [1] Foreign body sensation (\"feels like something is in there\") AND [2] " "irrigation didn't help    Negative: [1] Blurred vision AND [2] new or worsening    Negative: [1] Recent eye surgery AND [2] increasing eye pain    Negative: Ulcer or sore seen on the cornea (clear center part of the eye)    Negative: Vomiting    Negative: Difficult to awaken or acting confused (e.g., disoriented, slurred speech)    Negative: [1] Weakness of the face, arm or leg on one side of the body AND [2] new-onset    Negative: [1] Numbness of the face, arm or leg on one side of the body AND [2] new-onset    Negative: [1] Loss of speech or garbled speech AND [2] new-onset    Negative: Passed out (i.e., lost consciousness, collapsed and was not responding)    Negative: Sounds like a life-threatening emergency to the triager    Negative: Unable to walk, or can only walk with assistance (e.g., requires support)    Negative: Severe pain in one eye    Negative: Loss of vision or double vision (Exception: same as prior migraines)    Negative: Patient sounds very sick or weak to the triager    Negative: Stiff neck (can't touch chin to chest)    Negative: [1] Other family members (or roommates) with headaches AND [2] possibility of carbon monoxide exposure    Negative: [1] Vomiting AND [2] 2 or more times (Exception: similar to previous migraines)    Negative: [1] SEVERE headache (e.g., excruciating) AND [2] \"worst headache\" of life    Negative: [1] SEVERE headache AND [2] sudden-onset (i.e., reaching maximum intensity within seconds to 1 hour)    Negative: [1] SEVERE headache AND [2] fever    Negative: [1] Fever > 100.0 F (37.8 C) AND [2] diabetes mellitus or weak immune system (e.g., HIV positive, cancer chemo, splenectomy, organ transplant, chronic steroids)    Negative: [1] SEVERE headache (e.g., excruciating) AND [2] not improved after 2 hours of pain medicine    Negative: Fever > 104 F (40 C)    Protocols used: EYE PAIN AND OTHER SYMPTOMS-A-AH, HEADACHE-A-AH    JORGE Meng [facility] Nurse " Advisor   1/8/2023  1:58 PM

## 2023-01-09 NOTE — TELEPHONE ENCOUNTER
"Called patient and relayed referral numbers for Ortho/ENT, patient stated frustration with clinic because she is \"always getting the runaround with all these numbers\", writer apologized for the inconvenience and relayed numbers again to have on hand. Patient stated that she will call both offices and call at a later time to schedule hypertension follow-up (around 3/7/23). No further questions/concerns.    MARIA Tidwell RN  [facility]    "

## 2023-01-09 NOTE — TELEPHONE ENCOUNTER
Provider Response    1. Ortho  referral gave her number to call to schedule appointment: [phone]  2. I do not find scan or evaluation by eye specialist to collaborate information; however ent referral given: she also needs to call to schedule: call [phone].  3. Her BP is above she needs follow up for recheck

## 2023-01-11 ENCOUNTER — TELEPHONE (OUTPATIENT)
Dept: FAMILY MEDICINE | Facility: CLINIC | Age: 78
End: 2023-01-11

## 2023-01-11 NOTE — TELEPHONE ENCOUNTER
Reason for call:  Other   Patient called regarding (reason for call): upset patient  Additional comments: Patient is calling because she is upset about she says she has be diagnosed diabetic with out testing by Dr. Hanks.    She says she has left messages for the provider to call back, and she never gets a call back.    She is upset that she got charged for a visit she didn't have.     She was told a message would be sent to Patient relations and she was provided a number.     Email was sent to on site supervisors and patient relations.     Phone number to reach patient:  Home number on file [phone] (home)    Best Time:      Can we leave a detailed message on this number?  YES    Travel screening: Not Applicable

## 2023-01-12 ENCOUNTER — OFFICE VISIT (OUTPATIENT)
Dept: PODIATRY | Facility: CLINIC | Age: 78
End: 2023-01-12
Attending: FAMILY MEDICINE
Payer: MEDICARE

## 2023-01-12 ENCOUNTER — ANCILLARY PROCEDURE (OUTPATIENT)
Dept: GENERAL RADIOLOGY | Facility: CLINIC | Age: 78
End: 2023-01-12
Attending: PODIATRIST
Payer: MEDICARE

## 2023-01-12 VITALS — HEART RATE: 99 BPM | WEIGHT: 211 LBS | BODY MASS INDEX: 37.38 KG/M2 | OXYGEN SATURATION: 99 %

## 2023-01-12 DIAGNOSIS — M79.671 PAIN IN BOTH FEET: ICD-10-CM

## 2023-01-12 DIAGNOSIS — M20.41 HAMMER TOES, BILATERAL: ICD-10-CM

## 2023-01-12 DIAGNOSIS — M20.42 HAMMER TOES, BILATERAL: ICD-10-CM

## 2023-01-12 DIAGNOSIS — M79.672 PAIN IN BOTH FEET: ICD-10-CM

## 2023-01-12 DIAGNOSIS — M21.6X2 PRONATION OF BOTH FEET: ICD-10-CM

## 2023-01-12 DIAGNOSIS — M21.6X1 PRONATION OF BOTH FEET: ICD-10-CM

## 2023-01-12 DIAGNOSIS — M19.079 ARTHRITIS OF FOOT: ICD-10-CM

## 2023-01-12 DIAGNOSIS — M21.6X1 PRONATION OF BOTH FEET: Primary | ICD-10-CM

## 2023-01-12 DIAGNOSIS — M21.6X2 PRONATION OF BOTH FEET: Primary | ICD-10-CM

## 2023-01-12 PROBLEM — M54.50 ACUTE LEFT-SIDED LOW BACK PAIN WITHOUT SCIATICA: Status: ACTIVE | Noted: 2018-10-25

## 2023-01-12 PROBLEM — R39.15 URINARY URGENCY: Status: ACTIVE | Noted: 2021-08-19

## 2023-01-12 PROBLEM — M19.042 PRIMARY OSTEOARTHRITIS OF LEFT HAND: Status: ACTIVE | Noted: 2018-10-25

## 2023-01-12 PROBLEM — K57.32 SIGMOID DIVERTICULITIS: Status: ACTIVE | Noted: 2017-10-24

## 2023-01-12 PROBLEM — M51.26 DISPLACEMENT OF LUMBAR INTERVERTEBRAL DISC WITHOUT MYELOPATHY: Status: ACTIVE | Noted: 2018-10-25

## 2023-01-12 PROBLEM — D64.9 ANEMIA: Status: ACTIVE | Noted: 2018-10-25

## 2023-01-12 PROBLEM — K56.50: Status: ACTIVE | Noted: 2018-10-25

## 2023-01-12 PROBLEM — G47.33 OSA (OBSTRUCTIVE SLEEP APNEA): Status: ACTIVE | Noted: 2018-10-25

## 2023-01-12 PROBLEM — M19.90 OSTEOARTHROSIS: Status: ACTIVE | Noted: 2018-10-25

## 2023-01-12 PROBLEM — K57.30 DIVERTICULOSIS OF COLON: Status: ACTIVE | Noted: 2018-10-25

## 2023-01-12 PROBLEM — U07.1 COVID-19: Status: ACTIVE | Noted: 2021-10-14

## 2023-01-12 PROBLEM — M54.16 LUMBAR RADICULOPATHY: Status: ACTIVE | Noted: 2018-10-25

## 2023-01-12 PROBLEM — K21.9 GASTROESOPHAGEAL REFLUX DISEASE WITHOUT ESOPHAGITIS: Status: ACTIVE | Noted: 2017-09-19

## 2023-01-12 PROBLEM — K92.2 ACUTE LOWER GASTROINTESTINAL BLEEDING: Status: ACTIVE | Noted: 2017-11-23

## 2023-01-12 PROCEDURE — 99203 OFFICE O/P NEW LOW 30 MIN: CPT | Performed by: PODIATRIST

## 2023-01-12 PROCEDURE — 73630 X-RAY EXAM OF FOOT: CPT | Mod: TC | Performed by: RADIOLOGY

## 2023-01-12 NOTE — LETTER
1/12/2023         RE: Emerald Kulkarni  [address] [facility] [address]        Dear Colleague,    Thank you for referring your patient, Emerald Kulkarni, to the [facility]. Please see a copy of my visit note below.    S: Patient seen today in consult from Dr. Hanks and with complains of bilateral foot pain.  Points to medial arch.  Has had this for many years.  Describes it as a burning pain.  Aggrevated by activity and relieved by rest.  Patient has had orthotics in the past but has not worn these recently.  She states the left foot is worse and also getting pain in the area of the sinus tarsi.  Also has hammertoes that are painful.  Patient also has a callus on her left foot under her first metatarsal head.  She is retired.    ROS:  see above         Allergies   Allergen Reactions     Latex      Got a blister on leg, maybe from catheter?     Latex Other (See Comments)     Other reaction(s): Blisters     Tramadol Other (See Comments)     Other reaction(s): Abdominal pain         Current Outpatient Medications   Medication Sig Dispense Refill     ACULAR LS 0.4 % OP SOLN  (Patient not taking: No sig reported)       atorvastatin (LIPITOR) 40 MG tablet Take 1 tablet (40 mg) by mouth daily 90 tablet 3     gabapentin (NEURONTIN) 300 MG capsule Take 300 mg by mouth as needed       levothyroxine (SYNTHROID/LEVOTHROID) 50 MCG tablet Take 1 tablet (50 mcg) by mouth daily 90 tablet 3     lisinopril (ZESTRIL) 10 MG tablet Take 1 tablet (10 mg) by mouth daily 90 tablet 1     Lutein 20 MG CAPS Take 1 capsule by mouth       metoprolol succinate ER (TOPROL XL) 25 MG 24 hr tablet Take 1 tablet (25 mg) by mouth daily 90 tablet 1     Multiple Vitamins-Minerals (PRESERVISION/LUTEIN) CAPS Take 1 capsule by mouth       nitroGLYcerin (NITROSTAT) 0.4 MG sublingual tablet DISSOLVE 1 TABLET UNDER TONGUE every 5 minutes as needed for chest pain. if pain persists after 3 doses, seek prompt medical  attention. 25 tablet 0     polyethylene glycol-propylene glycol (SYSTANE ULTRA) 0.4-0.3 % SOLN ophthalmic solution Apply 1 drop to eye (Patient not taking: Reported on 9/1/2021)       PRED FORTE 1 % OP SUSP  (Patient not taking: No sig reported)       VIGAMOX 0.5 % OP SOLN  (Patient not taking: No sig reported)       ZYMAR 0.3 % OP SOLN  (Patient not taking: No sig reported)         Patient Active Problem List   Diagnosis     Psoriasis     Hx of melanoma excision     Chronic constipation     History of diverticulitis     Left renal mass     Heart murmur: Benign ?AS     Heart failure with preserved ejection fraction, NYHA class IV (H)     Morbid obesity (H)     Complex renal cyst     Urinary urgency     Sigmoid diverticulitis     Primary osteoarthritis of left hand     PAD (peripheral artery disease) (H)     Osteoarthrosis     MIRELLA (obstructive sleep apnea)     Memory impairment     Lumbar radiculopathy     Intermittent small bowel obstruction due to adhesions (H)     Hyperlipidemia     Hearing loss     Gastroesophageal reflux disease without esophagitis     Diverticulosis of colon     Displacement of lumbar intervertebral disc without myelopathy     COVID-19     CAD (coronary artery disease)     Chronic obstructive pulmonary disease (H)     Chronic CHF (congestive heart failure) (H)     Anemia     Age-related macular degeneration     Acute lower gastrointestinal bleeding     Acute left-sided low back pain without sciatica       No past medical history on file.    No past surgical history on file.    Family History   Problem Relation Age of Onset     Breast Cancer Mother      Breast Cancer Brother        Social History     Tobacco Use     Smoking status: Never     Smokeless tobacco: Never   Substance Use Topics     Alcohol use: Not on file         Exam:    Vitals: Pulse 99   Wt 95.7 kg (211 lb)   SpO2 99%   BMI 37.38 kg/m    BMI: Body mass index is 37.38 kg/m .  Height: Data Unavailable    Constitutional/ general:   Pt is in no apparent distress, appears well-nourished.  Cooperative with history and physical exam.     Psych:  The patient answered questions appropriately.  Normal affect.  Seems to have reasonable expectations, in terms of treatment.     Lungs:  Non labored breathing, non labored speech. No cough.  No audible wheezing. Even, quiet breathing.       Vascular:  positive pedal pulses bilaterally for both the DP and PT arteries.  CFT < 3 sec.  positive ankle edema.  negative pedal hair growth.    Neuro:  Alert and oriented x 3. Coordinated gait.  Light touch sensation is intact     Derm: Normal texture and turgor.  No erythema, ecchymosis, or cyanosis.  Callus noted left foot subfirst metatarsal head.    Musculoskeletal:    Lower extremity muscle strength is normal.  Patient is ambulatory without an assistive device or brace.  No gross deformities.  Normal ROM all fore foot and rearfoot joints.  No equinus.  With weightbearing patient has bilateral pronation with left being worse than right.  The left arch is quite collapsed with significant heel valgus and forefoot abduction.  Bone prominence noted along the left tarsometatarsal joint medial.  The right arch is not quite as collapse.  Decreased range of motion of midtarsal joint.  Hammering of lesser digits.    Radiographic Exam:  X-Ray Findings: Left foot show significant collapse of arch with arthritis tarsometatarsal joint.  Left foot x-ray consistent with pronation.  Talonavicular arthritis noted.    A:   Bilateral pronation left greater than right  Bilateral foot arthritis left greater than right  Hammertoes  Callus    P:  X-rays taken today of both feet.  Explained to patient she has significant deformity on her left foot and arthritis.  Discussed [place] AFO to help control mechanics of rear foot and decrease arthritis pain left foot.  Patient is in agreement to trying this.  We wrote a prescription for this.  Also wrote prescription for orthotic and  contralateral foot.  Once she gets these wear these in a good supportive shoe at all times both inside and outside.  Discussed with patient I do not do callus trimming in my clinic and her insurance will not pay for this.  Will refer to foot care nurse.  RETURN TO CLINIC PRN.  Thank you for allowing me participate in the care of this patient.        Tommy Santos DPM, FACFAS               Again, thank you for allowing me to participate in the care of your patient.        Sincerely,        Tommy Santos DPM

## 2023-01-12 NOTE — PATIENT INSTRUCTIONS
We wish you continued good healing. If you have any questions or concerns, please do not hesitate to contact us at  [phone]    [facility] (secure e-mail communication and access to your chart) to send a message or to make an appointment.    Please remember to call and schedule a follow up appointment if one was recommended at your earliest convenience.     PODIATRY CLINIC HOURS  TELEPHONE NUMBER    Dr. Tommy HUNTERPTREY FACFAS        Clinics:  [facility]   [place]  Tuesday 1PM-6PM  [place]  Wednesday 745AM-330PM  [place]  Thursday/Friday 745AM-230PM       [place] APPOINTMENTS  [phone]    [place] APPOINTMENTS  [phone]        If you need a medication refill, please contact us you may need lab work and/or a follow up visit prior to your refill (i.e. Antifungal medications).  If MRI needed please call Imaging at [phone]   HOW DO I GET MY KNEE SCOOTER? Knee scooters can be picked up at ANY Medical Supply stores with your knee scooter Prescription.  OR  Bring your signed prescription to an [facility] Medical Equipment showroom.             AFO Brace for left foot

## 2023-01-12 NOTE — PROGRESS NOTES
S: Patient seen today in consult from Dr. Hanks and with complains of bilateral foot pain.  Points to medial arch.  Has had this for many years.  Describes it as a burning pain.  Aggrevated by activity and relieved by rest.  Patient has had orthotics in the past but has not worn these recently.  She states the left foot is worse and also getting pain in the area of the sinus tarsi.  Also has hammertoes that are painful.  Patient also has a callus on her left foot under her first metatarsal head.  She is retired.    ROS:  see above         Allergies   Allergen Reactions     Latex      Got a blister on leg, maybe from catheter?     Latex Other (See Comments)     Other reaction(s): Blisters     Tramadol Other (See Comments)     Other reaction(s): Abdominal pain         Current Outpatient Medications   Medication Sig Dispense Refill     ACULAR LS 0.4 % OP SOLN  (Patient not taking: No sig reported)       atorvastatin (LIPITOR) 40 MG tablet Take 1 tablet (40 mg) by mouth daily 90 tablet 3     gabapentin (NEURONTIN) 300 MG capsule Take 300 mg by mouth as needed       levothyroxine (SYNTHROID/LEVOTHROID) 50 MCG tablet Take 1 tablet (50 mcg) by mouth daily 90 tablet 3     lisinopril (ZESTRIL) 10 MG tablet Take 1 tablet (10 mg) by mouth daily 90 tablet 1     Lutein 20 MG CAPS Take 1 capsule by mouth       metoprolol succinate ER (TOPROL XL) 25 MG 24 hr tablet Take 1 tablet (25 mg) by mouth daily 90 tablet 1     Multiple Vitamins-Minerals (PRESERVISION/LUTEIN) CAPS Take 1 capsule by mouth       nitroGLYcerin (NITROSTAT) 0.4 MG sublingual tablet DISSOLVE 1 TABLET UNDER TONGUE every 5 minutes as needed for chest pain. if pain persists after 3 doses, seek prompt medical attention. 25 tablet 0     polyethylene glycol-propylene glycol (SYSTANE ULTRA) 0.4-0.3 % SOLN ophthalmic solution Apply 1 drop to eye (Patient not taking: Reported on 9/1/2021)       PRED FORTE 1 % OP SUSP  (Patient not taking: No sig reported)       VIGAMOX  0.5 % OP SOLN  (Patient not taking: No sig reported)       ZYMAR 0.3 % OP SOLN  (Patient not taking: No sig reported)         Patient Active Problem List   Diagnosis     Psoriasis     Hx of melanoma excision     Chronic constipation     History of diverticulitis     Left renal mass     Heart murmur: Benign ?AS     Heart failure with preserved ejection fraction, NYHA class IV (H)     Morbid obesity (H)     Complex renal cyst     Urinary urgency     Sigmoid diverticulitis     Primary osteoarthritis of left hand     PAD (peripheral artery disease) (H)     Osteoarthrosis     MIRELLA (obstructive sleep apnea)     Memory impairment     Lumbar radiculopathy     Intermittent small bowel obstruction due to adhesions (H)     Hyperlipidemia     Hearing loss     Gastroesophageal reflux disease without esophagitis     Diverticulosis of colon     Displacement of lumbar intervertebral disc without myelopathy     COVID-19     CAD (coronary artery disease)     Chronic obstructive pulmonary disease (H)     Chronic CHF (congestive heart failure) (H)     Anemia     Age-related macular degeneration     Acute lower gastrointestinal bleeding     Acute left-sided low back pain without sciatica       No past medical history on file.    No past surgical history on file.    Family History   Problem Relation Age of Onset     Breast Cancer Mother      Breast Cancer Brother        Social History     Tobacco Use     Smoking status: Never     Smokeless tobacco: Never   Substance Use Topics     Alcohol use: Not on file         Exam:    Vitals: Pulse 99   Wt 95.7 kg (211 lb)   SpO2 99%   BMI 37.38 kg/m    BMI: Body mass index is 37.38 kg/m .  Height: Data Unavailable    Constitutional/ general:  Pt is in no apparent distress, appears well-nourished.  Cooperative with history and physical exam.     Psych:  The patient answered questions appropriately.  Normal affect.  Seems to have reasonable expectations, in terms of treatment.     Lungs:  Non  labored breathing, non labored speech. No cough.  No audible wheezing. Even, quiet breathing.       Vascular:  positive pedal pulses bilaterally for both the DP and PT arteries.  CFT < 3 sec.  positive ankle edema.  negative pedal hair growth.    Neuro:  Alert and oriented x 3. Coordinated gait.  Light touch sensation is intact     Derm: Normal texture and turgor.  No erythema, ecchymosis, or cyanosis.  Callus noted left foot subfirst metatarsal head.    Musculoskeletal:    Lower extremity muscle strength is normal.  Patient is ambulatory without an assistive device or brace.  No gross deformities.  Normal ROM all fore foot and rearfoot joints.  No equinus.  With weightbearing patient has bilateral pronation with left being worse than right.  The left arch is quite collapsed with significant heel valgus and forefoot abduction.  Bone prominence noted along the left tarsometatarsal joint medial.  The right arch is not quite as collapse.  Decreased range of motion of midtarsal joint.  Hammering of lesser digits.    Radiographic Exam:  X-Ray Findings: Left foot show significant collapse of arch with arthritis tarsometatarsal joint.  Left foot x-ray consistent with pronation.  Talonavicular arthritis noted.    A:   Bilateral pronation left greater than right  Bilateral foot arthritis left greater than right  Hammertoes  Callus    P:  X-rays taken today of both feet.  Explained to patient she has significant deformity on her left foot and arthritis.  Discussed [place] AFO to help control mechanics of rear foot and decrease arthritis pain left foot.  Patient is in agreement to trying this.  We wrote a prescription for this.  Also wrote prescription for orthotic and contralateral foot.  Once she gets these wear these in a good supportive shoe at all times both inside and outside.  Discussed with patient I do not do callus trimming in my clinic and her insurance will not pay for this.  Will refer to foot care nurse.  RETURN  TO CLINIC PRN.  Thank you for allowing me participate in the care of this patient.        Tommy Santos, YANA, FACFAS

## 2023-03-28 NOTE — PROGRESS NOTES
Chief Complaint - sinus concerns    History of Present Illness - Emerald Kulkarni is a 77 year old female who presents for evaluation of possible sinusitis. The patient describes symptoms of eye symptoms, and lumps in gums. Some nose bleeds at night last summer, but that got better. No discolored drainage. Some postnasal drainage with cough. No issues breathing in nose now. A dentist did a panorex and it showed some growths into sinuses. This was 1.5 years ago.      Tests personally reviewed today for this visit:   1.) CMP showed glucose 113 12/7/22  2.) TSH was normal    Past Medical History -   Patient Active Problem List   Diagnosis     Psoriasis     Hx of melanoma excision     Chronic constipation     History of diverticulitis     Left renal mass     Heart murmur: Benign ?AS     Heart failure with preserved ejection fraction, NYHA class IV (H)     Morbid obesity (H)     Complex renal cyst     Urinary urgency     Sigmoid diverticulitis     Primary osteoarthritis of left hand     PAD (peripheral artery disease) (H)     Osteoarthrosis     MIRELLA (obstructive sleep apnea)     Memory impairment     Lumbar radiculopathy     Intermittent small bowel obstruction due to adhesions (H)     Hyperlipidemia     Hearing loss     Gastroesophageal reflux disease without esophagitis     Diverticulosis of colon     Displacement of lumbar intervertebral disc without myelopathy     COVID-19     CAD (coronary artery disease)     Chronic obstructive pulmonary disease (H)     Chronic CHF (congestive heart failure) (H)     Anemia     Age-related macular degeneration     Acute lower gastrointestinal bleeding     Acute left-sided low back pain without sciatica       Current Medications -   Current Outpatient Medications:      ACULAR LS 0.4 % OP SOLN, , Disp: , Rfl:      atorvastatin (LIPITOR) 40 MG tablet, Take 1 tablet (40 mg) by mouth daily, Disp: 90 tablet, Rfl: 3     gabapentin (NEURONTIN) 300 MG capsule, Take 300 mg by mouth as needed,  Disp: , Rfl:      levothyroxine (SYNTHROID/LEVOTHROID) 50 MCG tablet, Take 1 tablet (50 mcg) by mouth daily, Disp: 90 tablet, Rfl: 3     lisinopril (ZESTRIL) 10 MG tablet, Take 1 tablet (10 mg) by mouth daily, Disp: 90 tablet, Rfl: 1     Lutein 20 MG CAPS, Take 1 capsule by mouth, Disp: , Rfl:      metoprolol succinate ER (TOPROL XL) 25 MG 24 hr tablet, Take 1 tablet (25 mg) by mouth daily, Disp: 90 tablet, Rfl: 1     Multiple Vitamins-Minerals (PRESERVISION/LUTEIN) CAPS, Take 1 capsule by mouth, Disp: , Rfl:      nitroGLYcerin (NITROSTAT) 0.4 MG sublingual tablet, DISSOLVE 1 TABLET UNDER TONGUE every 5 minutes as needed for chest pain. if pain persists after 3 doses, seek prompt medical attention., Disp: 25 tablet, Rfl: 0     polyethylene glycol-propylene glycol (SYSTANE ULTRA) 0.4-0.3 % SOLN ophthalmic solution, Apply 1 drop to eye (Patient not taking: Reported on 9/1/2021), Disp: , Rfl:      PRED FORTE 1 % OP SUSP, , Disp: , Rfl:      VIGAMOX 0.5 % OP SOLN, , Disp: , Rfl:      ZYMAR 0.3 % OP SOLN, , Disp: , Rfl:     Allergies -   Allergies   Allergen Reactions     Latex      Got a blister on leg, maybe from catheter?     Latex Other (See Comments)     Other reaction(s): Blisters     Tramadol Other (See Comments)     Other reaction(s): Abdominal pain         Social History -   Social History     Socioeconomic History     Marital status:    Tobacco Use     Smoking status: Never     Smokeless tobacco: Never       Family History -   Family History   Problem Relation Age of Onset     Breast Cancer Mother      Breast Cancer Brother        Physical Exam  General - The patient is nontoxic, in no distress. Alert, answers questions and cooperates with examination appropriately.   Neurologic - CN II-XII are intact. No focal neurologic deficits.   Voice and Breathing - The patient was breathing comfortably without the use of accessory muscles. There was no wheezing, stridor, or stertor.  The patients voice was clear  and strong.  Eyes - Extraocular movements intact.  Sclera were not icteric or injected, conjunctiva were pink and moist.  Mouth - dentures removed. Examination of the oral cavity showed pink, healthy oral mucosa. No lesions or ulcerations noted.  The tongue was mobile and midline. She has bilateral maxillary gingiva enlargement posteriorly bilaterally, possible osteomas.   Throat - The walls of the oropharynx were smooth, symmetric, and had no lesions or ulcerations.  No postnasal drainage.  The uvula was midline on elevation.  Nose - External contour is symmetric, no gross deflection or scars. No tenderness over the maxillary and frontal sinuses. Nasal mucosa is pink and moist with no abnormal mucus.  The septum was midline and non-obstructive, turbinates of normal size and position.  No polyps, masses, or purulence noted on examination.  Neck - Soft, non-tender. Palpation of the occipital, submental, submandibular, internal jugular chain, and supraclavicular nodes did not demonstrate any abnormal lymph nodes or masses. No parotid masses. Palpation of the thyroid was soft and smooth, with no nodules or goiter appreciated.  The trachea was mobile and midline.      A/P - Emerald Kulkarni is a 77 year old female with concerns over prominent bony growths of her gingiva bilateral in the maxillary aspect.  This was bilateral and symmetric and therefore I do not think it is anything ominous.  She denies any sinus infection symptoms.  I offered CT sinus but we both thought since she has been doing well for the last year and a half with this issue and no significant changes that it may not be worthwhile.  I am happy to look at her dental Panorex.  Return if things worsen.    Sanjay Anderson MD  Otolaryngology  [facility]

## 2023-03-29 ENCOUNTER — OFFICE VISIT (OUTPATIENT)
Dept: OTOLARYNGOLOGY | Facility: CLINIC | Age: 78
End: 2023-03-29
Payer: MEDICARE

## 2023-03-29 VITALS
OXYGEN SATURATION: 96 % | RESPIRATION RATE: 18 BRPM | HEART RATE: 93 BPM | SYSTOLIC BLOOD PRESSURE: 160 MMHG | DIASTOLIC BLOOD PRESSURE: 116 MMHG

## 2023-03-29 DIAGNOSIS — R09.89 SINUS COMPLAINT: ICD-10-CM

## 2023-03-29 DIAGNOSIS — H57.13 PAIN OF BOTH EYES: ICD-10-CM

## 2023-03-29 PROCEDURE — 99203 OFFICE O/P NEW LOW 30 MIN: CPT | Performed by: OTOLARYNGOLOGY

## 2023-03-29 NOTE — LETTER
3/29/2023         RE: Emerald Kulkarni  [address] [facility] [address]        Dear Colleague,    Thank you for referring your patient, Emerald Kulkarni, to the [facility]. Please see a copy of my visit note below.    Chief Complaint - sinus concerns    History of Present Illness - Emerald Kulkarni is a 77 year old female who presents for evaluation of possible sinusitis. The patient describes symptoms of eye symptoms, and lumps in gums. Some nose bleeds at night last summer, but that got better. No discolored drainage. Some postnasal drainage with cough. No issues breathing in nose now. A dentist did a panorex and it showed some growths into sinuses. This was 1.5 years ago.      Tests personally reviewed today for this visit:   1.) CMP showed glucose 113 12/7/22  2.) TSH was normal    Past Medical History -   Patient Active Problem List   Diagnosis     Psoriasis     Hx of melanoma excision     Chronic constipation     History of diverticulitis     Left renal mass     Heart murmur: Benign ?AS     Heart failure with preserved ejection fraction, NYHA class IV (H)     Morbid obesity (H)     Complex renal cyst     Urinary urgency     Sigmoid diverticulitis     Primary osteoarthritis of left hand     PAD (peripheral artery disease) (H)     Osteoarthrosis     MIRELLA (obstructive sleep apnea)     Memory impairment     Lumbar radiculopathy     Intermittent small bowel obstruction due to adhesions (H)     Hyperlipidemia     Hearing loss     Gastroesophageal reflux disease without esophagitis     Diverticulosis of colon     Displacement of lumbar intervertebral disc without myelopathy     COVID-19     CAD (coronary artery disease)     Chronic obstructive pulmonary disease (H)     Chronic CHF (congestive heart failure) (H)     Anemia     Age-related macular degeneration     Acute lower gastrointestinal bleeding     Acute left-sided low back pain without sciatica       Current Medications -    Current Outpatient Medications:      ACULAR LS 0.4 % OP SOLN, , Disp: , Rfl:      atorvastatin (LIPITOR) 40 MG tablet, Take 1 tablet (40 mg) by mouth daily, Disp: 90 tablet, Rfl: 3     gabapentin (NEURONTIN) 300 MG capsule, Take 300 mg by mouth as needed, Disp: , Rfl:      levothyroxine (SYNTHROID/LEVOTHROID) 50 MCG tablet, Take 1 tablet (50 mcg) by mouth daily, Disp: 90 tablet, Rfl: 3     lisinopril (ZESTRIL) 10 MG tablet, Take 1 tablet (10 mg) by mouth daily, Disp: 90 tablet, Rfl: 1     Lutein 20 MG CAPS, Take 1 capsule by mouth, Disp: , Rfl:      metoprolol succinate ER (TOPROL XL) 25 MG 24 hr tablet, Take 1 tablet (25 mg) by mouth daily, Disp: 90 tablet, Rfl: 1     Multiple Vitamins-Minerals (PRESERVISION/LUTEIN) CAPS, Take 1 capsule by mouth, Disp: , Rfl:      nitroGLYcerin (NITROSTAT) 0.4 MG sublingual tablet, DISSOLVE 1 TABLET UNDER TONGUE every 5 minutes as needed for chest pain. if pain persists after 3 doses, seek prompt medical attention., Disp: 25 tablet, Rfl: 0     polyethylene glycol-propylene glycol (SYSTANE ULTRA) 0.4-0.3 % SOLN ophthalmic solution, Apply 1 drop to eye (Patient not taking: Reported on 9/1/2021), Disp: , Rfl:      PRED FORTE 1 % OP SUSP, , Disp: , Rfl:      VIGAMOX 0.5 % OP SOLN, , Disp: , Rfl:      ZYMAR 0.3 % OP SOLN, , Disp: , Rfl:     Allergies -   Allergies   Allergen Reactions     Latex      Got a blister on leg, maybe from catheter?     Latex Other (See Comments)     Other reaction(s): Blisters     Tramadol Other (See Comments)     Other reaction(s): Abdominal pain         Social History -   Social History     Socioeconomic History     Marital status:    Tobacco Use     Smoking status: Never     Smokeless tobacco: Never       Family History -   Family History   Problem Relation Age of Onset     Breast Cancer Mother      Breast Cancer Brother        Physical Exam  General - The patient is nontoxic, in no distress. Alert, answers questions and cooperates with examination  appropriately.   Neurologic - CN II-XII are intact. No focal neurologic deficits.   Voice and Breathing - The patient was breathing comfortably without the use of accessory muscles. There was no wheezing, stridor, or stertor.  The patients voice was clear and strong.  Eyes - Extraocular movements intact.  Sclera were not icteric or injected, conjunctiva were pink and moist.  Mouth - dentures removed. Examination of the oral cavity showed pink, healthy oral mucosa. No lesions or ulcerations noted.  The tongue was mobile and midline. She has bilateral maxillary gingiva enlargement posteriorly bilaterally, possible osteomas.   Throat - The walls of the oropharynx were smooth, symmetric, and had no lesions or ulcerations.  No postnasal drainage.  The uvula was midline on elevation.  Nose - External contour is symmetric, no gross deflection or scars. No tenderness over the maxillary and frontal sinuses. Nasal mucosa is pink and moist with no abnormal mucus.  The septum was midline and non-obstructive, turbinates of normal size and position.  No polyps, masses, or purulence noted on examination.  Neck - Soft, non-tender. Palpation of the occipital, submental, submandibular, internal jugular chain, and supraclavicular nodes did not demonstrate any abnormal lymph nodes or masses. No parotid masses. Palpation of the thyroid was soft and smooth, with no nodules or goiter appreciated.  The trachea was mobile and midline.      A/P - Emerald Kulkarni is a 77 year old female with concerns over prominent bony growths of her gingiva bilateral in the maxillary aspect.  This was bilateral and symmetric and therefore I do not think it is anything ominous.  She denies any sinus infection symptoms.  I offered CT sinus but we both thought since she has been doing well for the last year and a half with this issue and no significant changes that it may not be worthwhile.  I am happy to look at her dental Panorex.  Return if things  worsen.    Sanjay Anderson MD  Otolaryngology  [facility]        Again, thank you for allowing me to participate in the care of your patient.        Sincerely,        Sanjay Anderson MD

## 2023-04-06 ENCOUNTER — TRANSFERRED RECORDS (OUTPATIENT)
Dept: FAMILY MEDICINE | Facility: CLINIC | Age: 78
End: 2023-04-06
Payer: MEDICARE

## 2023-06-19 ENCOUNTER — NURSE TRIAGE (OUTPATIENT)
Dept: FAMILY MEDICINE | Facility: CLINIC | Age: 78
End: 2023-06-19
Payer: MEDICARE

## 2023-06-19 NOTE — TELEPHONE ENCOUNTER
Patient reports injuring her back; was under the kitchen sink cleaning and hit her back when she was getting up; prior to this patient was also doing more heavy lifting     Denies bruising; denies changes to urine     Does state it radiates down both legs; instructed to be seen at  today; declined     Scheduled patient for BE appointment for tomorrow AM      Reason for Disposition    Pain radiates into the thigh or further down the leg now    Additional Information    Negative: Dangerous mechanism of injury (e.g., MVA, contact sports, trampoline, diving, fall > 10 feet or 3 meters)  (Exception: Back pain began > 1 hour after injury.)    Negative: Weakness (i.e., paralysis, loss of muscle strength) of the leg(s) or foot and sudden onset after back injury    Negative: Numbness (i.e., loss of sensation) of the leg(s) or foot and sudden onset after back injury    Negative: Major bleeding (actively dripping or spurting) that can't be stopped    Negative: Bullet, knife or other serious penetrating wound    Negative: Shock suspected (e.g., cold/pale/clammy skin, too weak to stand, low BP, rapid pulse)    Negative: Sounds like a life-threatening emergency to the triager    Negative: Back pain not from an injury    Negative: Back pain from overuse (work, exercise, gardening) OR from twisting, lifting, or bending injury    Negative: SEVERE pain in kidney area (flank) that follows a direct blow to that site    Negative: Blood in urine (red, pink, or tea-colored)    Negative: Unable to urinate (or only a few drops) > 4 hours and bladder feels very full (e.g., palpable bladder or strong urge to urinate)    Negative: Loss of bladder or bowel control (urine or bowel incontinence; wetting self, leaking stool) of new-onset    Negative: Numbness (loss of sensation) in groin or rectal area    Negative: Skin is split open or gaping (length > 1/2 inch or 12 mm)    Negative: Puncture wound of back    Negative: Bleeding won't stop after  10 minutes of direct pressure (using correct technique)    Negative: Sounds like a serious injury to the triager    Negative: Weakness of a leg or foot (e.g., unable to bear weight, dragging foot)    Negative: Numbness of a leg or foot (i.e., loss of sensation)    Negative: SEVERE back pain (e.g., excruciating, unable to do any normal activities) and not improved after pain medicine and CARE ADVICE    Protocols used: BACK INJURY-A-OH    Divya Alford RN  [facility]

## 2023-06-20 ENCOUNTER — OFFICE VISIT (OUTPATIENT)
Dept: FAMILY MEDICINE | Facility: CLINIC | Age: 78
End: 2023-06-20
Payer: MEDICARE

## 2023-06-20 VITALS
HEIGHT: 63 IN | BODY MASS INDEX: 35.44 KG/M2 | OXYGEN SATURATION: 95 % | TEMPERATURE: 97.9 F | DIASTOLIC BLOOD PRESSURE: 94 MMHG | SYSTOLIC BLOOD PRESSURE: 150 MMHG | RESPIRATION RATE: 20 BRPM | HEART RATE: 92 BPM | WEIGHT: 200 LBS

## 2023-06-20 DIAGNOSIS — S39.012A STRAIN OF LUMBAR REGION, INITIAL ENCOUNTER: Primary | ICD-10-CM

## 2023-06-20 PROCEDURE — 99213 OFFICE O/P EST LOW 20 MIN: CPT | Performed by: PHYSICIAN ASSISTANT

## 2023-06-20 RX ORDER — TIZANIDINE 2 MG/1
2-4 TABLET ORAL 3 TIMES DAILY PRN
Qty: 30 TABLET | Refills: 0 | Status: SHIPPED | OUTPATIENT
Start: 2023-06-20 | End: 2023-07-07

## 2023-06-20 RX ORDER — DICLOFENAC SODIUM 75 MG/1
75 TABLET, DELAYED RELEASE ORAL 2 TIMES DAILY
Qty: 14 TABLET | Refills: 0 | Status: SHIPPED | OUTPATIENT
Start: 2023-06-20 | End: 2023-06-27

## 2023-06-20 NOTE — PROGRESS NOTES
"  Assessment & Plan       ICD-10-CM    1. Strain of lumbar region, initial encounter  S39.012A tiZANidine (ZANAFLEX) 2 MG tablet     diclofenac (VOLTAREN) 75 MG EC tablet          Dicloenac and tizanidine for the next 7 days. Supportive therapy also discussed. Follow up if symptoms fail to improve or worsen.     Patient Instructions   Start diclofenac twice daily x7 days - anti inflammatory.  You can take the muscle relaxer, tizanidine, up to 3 times daily. Use it at bedtime, 2 tabs, x7 days.   You can use 1 tab in the morning and afternoon if needed.   You can use Tylenol with these medications.      Prescription drug management         BMI:   Estimated body mass index is 35.43 kg/m  as calculated from the following:    Height as of this encounter: 1.6 m (5' 3\").    Weight as of this encounter: 90.7 kg (200 lb).   Return in about 3 weeks (around 7/11/2023), or if symptoms worsen or fail to improve.     CHRIS [facility] ZEB Corbin is a 78 year old, presenting for the following health issues:  Pain        6/20/2023     8:46 AM   Additional Questions   Roomed by MP   Accompanied by NA         6/20/2023     8:46 AM   Patient Reported Additional Medications   Patient reports taking the following new medications None per patient     Pain     [facility] Chronic Recurring Back Pain    Question 6/20/2023  8:46 AM CDT - Filed by Patient   Where is your back pain located?  right lower back    left lower back    right side of neck    left side of neck    right buttock    left buttock   How would you describe your back pain?  gnawing   Where does your back pain spread?  right buttock    left buttock    right shoulder    left shoulder    right side of neck    left side of neck   Since you noticed your back pain, how has it changed?  always present, but gets better and worse    Injured back cleaning last week   Does your back pain interfere with your job? Not applicable   Since " "your last visit, have you tried any new treatment? Took a muscle relaxant from          Was cleaning out her lower cabinets in her kitchen  Has been doing a lot of heavy lifting due to her  recently having surgery  Having sciatica, in bed, both sides   The muscle relaxer was helpful somewhat       Review of Systems   Constitutional, musculoskeletal, neuro, skin systems are negative, except as otherwise noted.      Objective    BP (!) 146/90   Pulse 92   Temp 97.9  F (36.6  C) (Temporal)   Resp 20   Ht 1.6 m (5' 3\")   Wt 90.7 kg (200 lb)   SpO2 95%   BMI 35.43 kg/m    Body mass index is 35.43 kg/m .  Physical Exam   GENERAL: healthy, alert and no distress  MS: no gross musculoskeletal defects noted, no edema. Mild TTP of the paralumbar muscles   SKIN: no suspicious lesions or rashes  NEURO: Normal strength and tone, mentation intact and speech normal  PSYCH: mentation appears normal, affect normal/bright              "

## 2023-06-20 NOTE — PATIENT INSTRUCTIONS
Start diclofenac twice daily x7 days - anti inflammatory.  You can take the muscle relaxer, tizanidine, up to 3 times daily. Use it at bedtime, 2 tabs, x7 days.   You can use 1 tab in the morning and afternoon if needed.   You can use Tylenol with these medications.

## 2023-06-26 ENCOUNTER — ANCILLARY PROCEDURE (OUTPATIENT)
Dept: GENERAL RADIOLOGY | Facility: CLINIC | Age: 78
End: 2023-06-26
Attending: INTERNAL MEDICINE
Payer: MEDICARE

## 2023-06-26 ENCOUNTER — OFFICE VISIT (OUTPATIENT)
Dept: FAMILY MEDICINE | Facility: CLINIC | Age: 78
End: 2023-06-26
Payer: MEDICARE

## 2023-06-26 VITALS
HEART RATE: 76 BPM | BODY MASS INDEX: 35.4 KG/M2 | DIASTOLIC BLOOD PRESSURE: 80 MMHG | SYSTOLIC BLOOD PRESSURE: 140 MMHG | RESPIRATION RATE: 20 BRPM | HEIGHT: 63 IN | WEIGHT: 199.8 LBS | OXYGEN SATURATION: 97 %

## 2023-06-26 DIAGNOSIS — R41.3 MEMORY CHANGE: ICD-10-CM

## 2023-06-26 DIAGNOSIS — E66.01 MORBID OBESITY (H): ICD-10-CM

## 2023-06-26 DIAGNOSIS — M54.40 ACUTE BACK PAIN WITH SCIATICA, UNSPECIFIED LATERALITY: Primary | ICD-10-CM
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
## 2023-06-26 DIAGNOSIS — R10.31 GROIN PAIN, RIGHT: ICD-10-CM

## 2023-06-26 PROCEDURE — 72170 X-RAY EXAM OF PELVIS: CPT | Mod: TC | Performed by: RADIOLOGY

## 2023-06-26 PROCEDURE — 73502 X-RAY EXAM HIP UNI 2-3 VIEWS: CPT | Mod: TC | Performed by: RADIOLOGY

## 2023-06-26 PROCEDURE — 72100 X-RAY EXAM L-S SPINE 2/3 VWS: CPT | Mod: TC | Performed by: RADIOLOGY

## 2023-06-26 PROCEDURE — 99213 OFFICE O/P EST LOW 20 MIN: CPT | Performed by: INTERNAL MEDICINE

## 2023-06-26 ASSESSMENT — PAIN SCALES - GENERAL: PAINLEVEL: NO PAIN (0)

## 2023-06-26 NOTE — PROGRESS NOTES
LALA Corbin is a 78 year old, presenting for the following health issues:  Back Pain (Patient fell on Friday night on her driveway.)    Patient was watering her flowers- went to turn around off the step and the watering can was heavy and spun her and she fell on her right side. She fell in her driveway. This happend Friday evening. Been getting sharp pains in the right lower side since Friday evening now.     Original cause of back pain: fall  First noticed back pain: 6/19/2023  Patient feels back pain: comes and goesLocation of back pain:  Right lower back  Description of back pain: sharp and shooting  Back pain spreads: right thigh and right knee    Since patient first noticed back pain, pain is: gradually worsening  Does back pain interfere with her job:  Not applicable  On a scale of 1-10 (10 being the worst), patient describes pain as:  10  What makes back pain worse: bending, certain positions, sitting and twisting  Acupuncture: not tried  Acetaminophen: helpful  Activity or exercise: not tried  Chiropractor:  Not tried  Cold: not tried  Heat: not tried  Massage: not tried  Muscle relaxants: Tizanidine is helpful.helpful  NSAIDS: Diclofenac is not helfpul  Opioids: not tried  Physical Therapy: not tried  Rest: helpful  Steroid Injection: not tried  Stretching: not tried  Surgery: not tried  TENS unit: not tried  Topical pain relievers: not tried  Other healthcare providers patient is seeing for back pain: None    REVIEW OF SYSTEMS   CONSTITUTIONAL:POSITIVE  for morbid obesity.  INTEGUMENTARY/SKIN: NEGATIVE for worrisome rashes, moles or lesions  EYES: NEGATIVE for vision changes or irritation  ENT/MOUTH: NEGATIVE for ear, mouth and throat problems  RESP: NEGATIVE for significant cough or SOB  BREAST: NEGATIVE for masses, tenderness or discharge  CV: NEGATIVE for chest pain, palpitations or peripheral edema  GI: POSITIVE for   : NEGATIVE for frequency, dysuria, or hematuria  MUSCULOSKELETAL: As  "above.  NEURO: NEGATIVE for weakness, dizziness or paresthesias  ENDOCRINE: NEGATIVE for temperature intolerance, skin/hair changes  HEME: NEGATIVE for bleeding problems  PSYCHIATRIC: NEGATIVE for changes in mood or affect      OBJECTIVE   BP (!) 166/95 (BP Location: Left arm, Patient Position: Sitting, Cuff Size: Adult Large)   Pulse 76   Resp 20   Ht 1.6 m (5' 3\")   Wt 90.6 kg (199 lb 12.8 oz)   SpO2 97%   BMI 35.39 kg/m    Body mass index is 35.39 kg/m .  Physical Exam   GENERAL: healthy, alert and no distress  EYES: Eyes grossly normal to inspection and conjunctivae and sclerae normal  HENT: normal cephalic/atraumatic and oral mucous membranes moist  RESP: No audible wheezes nor retractions.  MS: Right groin on external rotation at right hip.  NEURO: Normal strength and tone, mentation intact and speech normal  PSYCH: mentation appears normal, affect normal/bright    ASSESSMENT/PLAN  Acute back pain with sciatica, unspecified laterality  - XR Lumbar Spine 2/3 Views  - XR Pelvis 1/2 Views  - MR Lumbar Spine w/o Contrast; Future  - Physical Therapy Referral; Future  - Miscellaneous Order for DME - ONLY FOR DME  - MR Lumbar Spine w/o Contrast    Falls, subsequent encounter  - XR Hip Right 2-3 Views  - MR Lumbar Spine w/o Contrast; Future  - Physical Therapy Referral; Future    Groin pain, right  - XR Hip Right 2-3 Views    Morbid obesity (H)  - REVIEW OF HEALTH MAINTENANCE PROTOCOL ORDERS    Disposition:  Follow-up in 4 weeks or as needed.    Cristhian Vera MD  Internal Medicine          "

## 2023-06-28 ENCOUNTER — TELEPHONE (OUTPATIENT)
Dept: FAMILY MEDICINE | Facility: CLINIC | Age: 78
End: 2023-06-28
Payer: MEDICARE

## 2023-06-28 NOTE — TELEPHONE ENCOUNTER
Patient would like a referral to [facility] ([facility])-- In [place]-- their number is  [phone]   or call patient with questions at [phone]    Lydia PARTIDA,   [facility]

## 2023-06-28 NOTE — TELEPHONE ENCOUNTER
Forms faxed to [phone] verified via right fax.     Micheal RUCKER [facility]    Primary Care

## 2023-06-30 ENCOUNTER — TRANSFERRED RECORDS (OUTPATIENT)
Dept: HEALTH INFORMATION MANAGEMENT | Facility: CLINIC | Age: 78
End: 2023-06-30
Payer: MEDICARE

## 2023-07-06 ENCOUNTER — TELEPHONE (OUTPATIENT)
Dept: FAMILY MEDICINE | Facility: CLINIC | Age: 78
End: 2023-07-06

## 2023-07-06 DIAGNOSIS — M48.061 SPINAL STENOSIS, LUMBAR REGION, WITHOUT NEUROGENIC CLAUDICATION: Primary | ICD-10-CM

## 2023-07-06 DIAGNOSIS — M51.16 LUMBAR DISC HERNIATION WITH RADICULOPATHY: ICD-10-CM

## 2023-07-06 DIAGNOSIS — S39.012A STRAIN OF LUMBAR REGION, INITIAL ENCOUNTER: ICD-10-CM

## 2023-07-06 NOTE — TELEPHONE ENCOUNTER
Test Results    Contacts       Type Contact Phone/Fax    07/06/2023 05:06 PM CDT Phone (Incoming) Emerald Kulkarni (Self) [phone] (H)          Who ordered the test:  [facility]    Type of test: MRI    Date of test:  Unsure, done through [facility]     Where was the test performed: [facility]    What are your questions/concerns?:  Patient wondering what the results were and what the next steps are for her continued back pain.     Okay to leave a detailed message?: Yes at Cell number on file:    Telephone Information:   Mobile [phone]

## 2023-07-06 NOTE — TELEPHONE ENCOUNTER
Called and left a voicemail message to return our call to gather more detailed information. (when was this done, who ordered the test...)  Nadya Lu MA  [facility]   Primary Care

## 2023-07-07 RX ORDER — TIZANIDINE 2 MG/1
2-4 TABLET ORAL 3 TIMES DAILY PRN
Qty: 30 TABLET | Refills: 1 | Status: SHIPPED | OUTPATIENT
Start: 2023-07-07 | End: 2023-08-29

## 2023-07-07 NOTE — TELEPHONE ENCOUNTER
Called and spoke to patient. Informed patient of message from provider and patient verbally understand.      FYI: Patient would like Dr. Hanks to know that she injured her back. Please see office visit notes: 06/20/2023 and 06/26/2023.      Kacy Booker, CMA

## 2023-07-07 NOTE — TELEPHONE ENCOUNTER
Called mobile # and left a voicemail message to return our call to gather more information.  Called home # and spoke to the patient and she states that she went to [facility] in [place] location, phone # [phone] to have a Lumbar Spine No Contrast MRI on 6/30/2023. Patient has a disc but no results.  Called [facility] and spoke to Viv, medical records. She will be faxing this over, waiting for the fax.  Nadya Lu MA  [facility]   Primary Care

## 2023-07-17 RX ORDER — PREDNISONE 10 MG/1
10 TABLET ORAL EVERY MORNING
Qty: 10 TABLET | Refills: 2 | Status: SHIPPED | OUTPATIENT
Start: 2023-07-17 | End: 2023-08-22

## 2023-07-21 ENCOUNTER — THERAPY VISIT (OUTPATIENT)
Dept: PHYSICAL THERAPY | Facility: CLINIC | Age: 78
End: 2023-07-21
Attending: INTERNAL MEDICINE
Payer: MEDICARE

## 2023-07-21 DIAGNOSIS — M51.16 LUMBAR DISC HERNIATION WITH RADICULOPATHY: ICD-10-CM

## 2023-07-21 DIAGNOSIS — M48.061 SPINAL STENOSIS, LUMBAR REGION, WITHOUT NEUROGENIC CLAUDICATION: ICD-10-CM

## 2023-07-21 DIAGNOSIS — M48.061 SPINAL STENOSIS OF LUMBAR REGION WITHOUT NEUROGENIC CLAUDICATION: ICD-10-CM

## 2023-07-21 PROCEDURE — 97161 PT EVAL LOW COMPLEX 20 MIN: CPT | Mod: GP | Performed by: PHYSICAL THERAPIST

## 2023-07-21 PROCEDURE — 97110 THERAPEUTIC EXERCISES: CPT | Mod: GP | Performed by: PHYSICAL THERAPIST

## 2023-07-21 NOTE — PROGRESS NOTES
PHYSICAL THERAPY EVALUATION  Type of Visit: Evaluation    See electronic medical record for Abuse and Falls Screening details.    Subjective       Presenting condition or subjective complaint: low back pain, stenosis  Date of onset: 23    Relevant medical history: -- (occasional dizziness)   Dates & types of surgery: AMARIS SAM, neuropathy-left foot collapse-has a brace, Right RTC repair 10+ years    Prior diagnostic imaging/testing results: MRI; X-ray     Prior therapy history for the same diagnosis, illness or injury: Yes for back pain/aches    Living Environment  Social support: With a significant other or spouse   Type of home: House   Stairs to enter the home: Yes   Is there a railing: Yes   Ramp: No   Stairs inside the home: Yes       Help at home:    Equipment owned:       Employment: No    Hobbies/Interests: gardening    Patient goals for therapy: gardening, standing longer than 30 minutes, walking  Minimal exercise besides gardening  Pain assessment: Pain present  Location: AMARIS low back/Ratin/10   Pain can be an 8/10 without prednisone  Goals: getting up from low chairs, gardening, standing 1 hour     Objective   LUMBAR SPINE EVALUATION  PAIN: Pain Level at Rest: 2/10  ROM:   (Degrees) Left AROM Left PROM  Right AROM Right PROM   Hip Flexion       Hip Extension       Hip Abduction       Hip Adduction       Hip Internal Rotation       Hip External Rotation       Knee Flexion       Knee Extension       Trunk rotation 75% 75%   Lumbar Flexion 25% and pain   Lumbar Extension 50% and stiff   Pain: incr sxs with incr motions flex and ext  End feel:     PELVIC/SI SCREEN: WNL  STRENGTH: hip flex 4-/5 AMARIS  Glut med 3+/5 AMARIS  Glut max 4-/5 AMARIS-cramps rapidly  TA activation: poor    FLEXIBILITY: decr AMARIS hamstring and quad flexibility AMARIS  LUMBAR/HIP Special Tests: SLR negative AMARIS    PALPATION: WNL      Assessment & Plan   CLINICAL IMPRESSIONS  Medical Diagnosis: lumbar spinal stenosis    Treatment Diagnosis:  lumbar spinal stenosis   Impression/Assessment: Patient is a 78 year old female with lumbar stenosis complaints.  The following significant findings have been identified: Pain, Decreased ROM/flexibility, Decreased strength and Decreased activity tolerance. These impairments interfere with their ability to perform self care tasks, recreational activities and household chores as compared to previous level of function.     Clinical Decision Making (Complexity):  Clinical Presentation: Stable/Uncomplicated  Clinical Presentation Rationale: based on medical and personal factors listed in PT evaluation  Clinical Decision Making (Complexity): Low complexity    PLAN OF CARE  Treatment Interventions:  Interventions: Manual Therapy, Neuromuscular Re-education, Therapeutic Activity, Therapeutic Exercise    Long Term Goals     PT Goal 1  Goal Identifier: standing tolerance current 30 minutes  Goal Description: 60 minutes  Rationale: to maximize safety and independence with performance of ADLs and functional tasks;to maximize safety and independence within the home;to maximize safety and independence within the community  Target Date: 09/15/23      Frequency of Treatment: 1 time a week  Duration of Treatment: 8 weeks    Recommended Referrals to Other Professionals: none  Education Assessment:        Risks and benefits of evaluation/treatment have been explained.   Patient/Family/caregiver agrees with Plan of Care.     Evaluation Time:     PT Eval, Low Complexity Minutes (49450): 25       Signing Clinician: Chan Rose, PT      [facility]                                                                                   OUTPATIENT PHYSICAL THERAPY      PLAN OF TREATMENT FOR OUTPATIENT REHABILITATION   Patient's Last Name, First Name, Emerald Tavares YOB: 1945   Provider's Name   [facility]   Medical Record No.  [id number]     Onset Date: 07/14/23   Start of Care Date: 07/21/23     Medical Diagnosis:  lumbar spinal stenosis      PT Treatment Diagnosis:  lumbar spinal stenosis Plan of Treatment  Frequency/Duration: 1 time a week/ 8 weeks    Certification date from 07/21/23 to 09/15/23         See note for plan of treatment details and functional goals     Chan Rose, PT                         I CERTIFY THE NEED FOR THESE SERVICES FURNISHED UNDER        THIS PLAN OF TREATMENT AND WHILE UNDER MY CARE     (Physician attestation of this document indicates review and certification of the therapy plan).                  Referring Provider:  Cristhian Vera      Initial Assessment  See Epic Evaluation- Start of Care Date: 07/21/23

## 2023-08-01 ENCOUNTER — TRANSFERRED RECORDS (OUTPATIENT)
Dept: HEALTH INFORMATION MANAGEMENT | Facility: CLINIC | Age: 78
End: 2023-08-01
Payer: MEDICARE

## 2023-08-02 ENCOUNTER — TELEPHONE (OUTPATIENT)
Dept: FAMILY MEDICINE | Facility: CLINIC | Age: 78
End: 2023-08-02

## 2023-08-02 ENCOUNTER — THERAPY VISIT (OUTPATIENT)
Dept: PHYSICAL THERAPY | Facility: CLINIC | Age: 78
End: 2023-08-02
Payer: MEDICARE

## 2023-08-02 DIAGNOSIS — M48.061 SPINAL STENOSIS OF LUMBAR REGION WITHOUT NEUROGENIC CLAUDICATION: Primary | ICD-10-CM

## 2023-08-02 PROCEDURE — 97140 MANUAL THERAPY 1/> REGIONS: CPT | Mod: GP

## 2023-08-02 PROCEDURE — 97110 THERAPEUTIC EXERCISES: CPT | Mod: GP

## 2023-08-02 NOTE — TELEPHONE ENCOUNTER
FYI - Status Update    Who is Calling: patient    Update:   Patient was seen by PT and she booked appointments for the rest of the month. Patient  Saw the surgeon, Dr Fonseca and he recognized the pinched nerve in spine and then had a CT done of her shoulder and neck-results are negative. Please call if you have any questions and to talk to Nhi, his assistant, at [phone].     Does caller want a call/response back: Patient will call back and schedule a Telephone visit to discuss.  Nadya Lu [facility]   Primary Care

## 2023-08-08 ENCOUNTER — ANCILLARY PROCEDURE (OUTPATIENT)
Dept: CT IMAGING | Facility: CLINIC | Age: 78
End: 2023-08-08
Attending: INTERNAL MEDICINE
Payer: MEDICARE

## 2023-08-08 DIAGNOSIS — R41.3 MEMORY CHANGE: ICD-10-CM
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
## 2023-08-08 PROCEDURE — 70450 CT HEAD/BRAIN W/O DYE: CPT | Mod: TC | Performed by: RADIOLOGY

## 2023-08-08 PROCEDURE — G1010 CDSM STANSON: HCPCS | Performed by: RADIOLOGY

## 2023-08-09 ENCOUNTER — THERAPY VISIT (OUTPATIENT)
Dept: PHYSICAL THERAPY | Facility: CLINIC | Age: 78
End: 2023-08-09
Payer: MEDICARE

## 2023-08-09 DIAGNOSIS — M48.061 SPINAL STENOSIS OF LUMBAR REGION WITHOUT NEUROGENIC CLAUDICATION: Primary | ICD-10-CM

## 2023-08-09 PROCEDURE — 97110 THERAPEUTIC EXERCISES: CPT | Mod: GP | Performed by: PHYSICAL THERAPIST

## 2023-08-09 PROCEDURE — 97530 THERAPEUTIC ACTIVITIES: CPT | Mod: GP | Performed by: PHYSICAL THERAPIST

## 2023-08-19 DIAGNOSIS — M48.061 SPINAL STENOSIS, LUMBAR REGION, WITHOUT NEUROGENIC CLAUDICATION: ICD-10-CM

## 2023-08-19 DIAGNOSIS — M51.16 LUMBAR DISC HERNIATION WITH RADICULOPATHY: ICD-10-CM

## 2023-08-22 RX ORDER — PREDNISONE 10 MG/1
10 TABLET ORAL EVERY MORNING
Qty: 10 TABLET | Refills: 0 | Status: SHIPPED | OUTPATIENT
Start: 2023-08-22 | End: 2023-08-29

## 2023-08-23 ENCOUNTER — THERAPY VISIT (OUTPATIENT)
Dept: PHYSICAL THERAPY | Facility: CLINIC | Age: 78
End: 2023-08-23
Payer: MEDICARE

## 2023-08-23 DIAGNOSIS — M48.061 SPINAL STENOSIS OF LUMBAR REGION WITHOUT NEUROGENIC CLAUDICATION: Primary | ICD-10-CM

## 2023-08-23 PROCEDURE — 97110 THERAPEUTIC EXERCISES: CPT | Mod: GP | Performed by: PHYSICAL THERAPIST

## 2023-08-23 PROCEDURE — 97112 NEUROMUSCULAR REEDUCATION: CPT | Mod: GP | Performed by: PHYSICAL THERAPIST

## 2023-08-29 ENCOUNTER — TRANSFERRED RECORDS (OUTPATIENT)
Dept: HEALTH INFORMATION MANAGEMENT | Facility: CLINIC | Age: 78
End: 2023-08-29
Payer: MEDICARE

## 2023-08-29 DIAGNOSIS — S39.012A STRAIN OF LUMBAR REGION, INITIAL ENCOUNTER: ICD-10-CM

## 2023-08-29 DIAGNOSIS — M48.061 SPINAL STENOSIS, LUMBAR REGION, WITHOUT NEUROGENIC CLAUDICATION: ICD-10-CM

## 2023-08-29 DIAGNOSIS — M51.16 LUMBAR DISC HERNIATION WITH RADICULOPATHY: ICD-10-CM

## 2023-08-29 RX ORDER — TIZANIDINE 2 MG/1
2-4 TABLET ORAL 3 TIMES DAILY PRN
Qty: 30 TABLET | Refills: 0 | Status: SHIPPED | OUTPATIENT
Start: 2023-08-29

## 2023-08-29 RX ORDER — PREDNISONE 10 MG/1
10 TABLET ORAL EVERY MORNING
Qty: 10 TABLET | Refills: 0 | Status: SHIPPED | OUTPATIENT
Start: 2023-08-29 | End: 2023-09-18

## 2023-08-30 ENCOUNTER — THERAPY VISIT (OUTPATIENT)
Dept: PHYSICAL THERAPY | Facility: CLINIC | Age: 78
End: 2023-08-30
Payer: MEDICARE

## 2023-08-30 DIAGNOSIS — M48.061 SPINAL STENOSIS OF LUMBAR REGION WITHOUT NEUROGENIC CLAUDICATION: Primary | ICD-10-CM

## 2023-08-30 PROCEDURE — 97110 THERAPEUTIC EXERCISES: CPT | Mod: GP | Performed by: PHYSICAL THERAPIST

## 2023-08-30 PROCEDURE — 97112 NEUROMUSCULAR REEDUCATION: CPT | Mod: GP | Performed by: PHYSICAL THERAPIST

## 2023-09-05 ENCOUNTER — THERAPY VISIT (OUTPATIENT)
Dept: PHYSICAL THERAPY | Facility: CLINIC | Age: 78
End: 2023-09-05
Payer: MEDICARE

## 2023-09-05 DIAGNOSIS — M48.061 SPINAL STENOSIS OF LUMBAR REGION WITHOUT NEUROGENIC CLAUDICATION: Primary | ICD-10-CM

## 2023-09-05 PROCEDURE — 97110 THERAPEUTIC EXERCISES: CPT | Mod: GP

## 2023-09-12 ENCOUNTER — THERAPY VISIT (OUTPATIENT)
Dept: PHYSICAL THERAPY | Facility: CLINIC | Age: 78
End: 2023-09-12
Payer: MEDICARE

## 2023-09-12 DIAGNOSIS — M48.061 SPINAL STENOSIS OF LUMBAR REGION WITHOUT NEUROGENIC CLAUDICATION: Primary | ICD-10-CM

## 2023-09-12 PROCEDURE — 97110 THERAPEUTIC EXERCISES: CPT | Mod: GP

## 2023-09-12 NOTE — PROGRESS NOTES
"   09/12/23 0500   Appointment Info   Signing clinician's name / credentials Adrian Cardenas DPT   Total/Authorized Visits 8   Visits Used 7   Medical Diagnosis lumbar spinal stenosis   PT Tx Diagnosis lumbar spinal stenosis   Precautions/Limitations AMARIS THAs, cardiac-stent, neuropathy, hx dizziness   Quick Adds Certification   Progress Note/Certification   Start of Care Date 07/21/23   Onset of illness/injury or Date of Surgery 07/14/23   Therapy Frequency 1 time a week   Predicted Duration 6   Certification date from 09/15/23   Certification date to 10/24/23   PT Goal 1   Goal Identifier standing tolerance current 30 minutes   Goal Description 60 minutes   Rationale to maximize safety and independence with performance of ADLs and functional tasks;to maximize safety and independence within the home;to maximize safety and independence within the community   Target Date 09/15/23   Date Met 09/12/23   PT Goal 2   Goal Description Pt will be able to stand from an 18\" chair with good form and no pain in the low back.   Rationale to maximize safety and independence with performance of ADLs and functional tasks   Target Date 10/01/23   Subjective Report   Subjective Report Pt reports improved tolerance for standing since last session. She has been able to cook meals two nights in a row. She has also been able to stand up from her couch more easily and tie her shoes. She also states she may have had a cardiac event 3 days ago and experienced chest pain, L arm pit pain, and L hand tingling. She states she has 4 cardiac/BP medications and doesn't take any of them.   Objective Measures   Objective Measures Objective Measure 1   Objective Measure 1   Objective Measure pain and decr flex/ext-trunk mobility   Details goal 75% mobility AMARIS and painfree   Treatment Interventions (PT)   Interventions Therapeutic Procedure/Exercise;Neuromuscular Re-education   Therapeutic Procedure/Exercise   Therapeutic Procedures: strength, endurance, " ROM, flexibillity minutes (99023) 40   Ther Proc 1 3-way SB roll out x 2 min   PTRx Ther Proc 1 Glute vectors w/YTB 3x3   PTRx Ther Proc 2 STS w/5 lb DB 3x5   PTRx Ther Proc 3 March at counter x 3<>   PTRx Ther Proc 4 Tandem walk at counter x 3<>   Skilled Intervention exercise directions   Plan   Home program Continue current HEP, added STS w/weight and march at counter   Plan for next session Continue standing exercise progression, core stability   Comments   Comments BP measured at 169/107 at end of session today. Pt encouraged to take her BP medication and get in contact with her doctor regarding her cardiac status   Total Session Time   Timed Code Treatment Minutes 40   Total Treatment Time (sum of timed and untimed services) 40         [facility]                                                                                   OUTPATIENT PHYSICAL THERAPY    PLAN OF TREATMENT FOR OUTPATIENT REHABILITATION   Patient's Last Name, First Name, Emerald Tavares YOB: 1945   Provider's Name   [facility]   Medical Record No.  [id number]     Onset Date: 07/14/23  Start of Care Date: 07/21/23     Medical Diagnosis:  lumbar spinal stenosis      PT Treatment Diagnosis:  lumbar spinal stenosis Plan of Treatment  Frequency/Duration: 1 time a week/ 6    Certification date from 09/15/23 to 10/24/23         See note for plan of treatment details and functional goals     Adrian Cardenas, PT                         I CERTIFY THE NEED FOR THESE SERVICES FURNISHED UNDER        THIS PLAN OF TREATMENT AND WHILE UNDER MY CARE     (Physician attestation of this document indicates review and certification of the therapy plan).                Referring Provider:  Cristhian Arias Vocal      Initial Assessment  See Epic Evaluation- Start of Care Date: 07/21/23

## 2023-09-16 DIAGNOSIS — M51.16 LUMBAR DISC HERNIATION WITH RADICULOPATHY: ICD-10-CM

## 2023-09-16 DIAGNOSIS — M48.061 SPINAL STENOSIS, LUMBAR REGION, WITHOUT NEUROGENIC CLAUDICATION: ICD-10-CM

## 2023-09-18 ENCOUNTER — TELEPHONE (OUTPATIENT)
Dept: FAMILY MEDICINE | Facility: CLINIC | Age: 78
End: 2023-09-18
Payer: MEDICARE

## 2023-09-18 RX ORDER — PREDNISONE 10 MG/1
10 TABLET ORAL EVERY MORNING
Qty: 10 TABLET | Refills: 0 | Status: SHIPPED | OUTPATIENT
Start: 2023-09-18 | End: 2023-11-03

## 2023-09-18 NOTE — TELEPHONE ENCOUNTER
[facility] pharmacy staff called to ask about a script for Prednisone. They stated their system is acting up and wonders if a script was sent today for this medication.     RN updated with script below:   Disp Refills Start End GILSON   predniSONE (DELTASONE) 10 MG tablet 10 tablet 0 9/18/2023  No   Sig - Route: Take 1 tablet (10 mg) by mouth every morning. - Oral   Sent to pharmacy as: predniSONE 10 MG Oral Tablet (DELTASONE)   Class: E-Prescribe   Order: [id number]   E-Prescribing Status: Receipt confirmed by pharmacy (9/18/2023 12:17 PM CDT)       She verbalized understanding. No further questions    Aparna Echevarria RN on 9/18/2023 at 2:06 PM

## 2023-09-20 ENCOUNTER — THERAPY VISIT (OUTPATIENT)
Dept: PHYSICAL THERAPY | Facility: CLINIC | Age: 78
End: 2023-09-20
Payer: MEDICARE

## 2023-09-20 DIAGNOSIS — M48.061 SPINAL STENOSIS OF LUMBAR REGION WITHOUT NEUROGENIC CLAUDICATION: Primary | ICD-10-CM

## 2023-09-20 PROCEDURE — 97110 THERAPEUTIC EXERCISES: CPT | Mod: GP

## 2023-09-20 NOTE — PROGRESS NOTES
[facility]                                                                                   OUTPATIENT PHYSICAL THERAPY    PLAN OF TREATMENT FOR OUTPATIENT REHABILITATION   Patient's Last Name, First Name, Emerald Tavares YOB: 1945   Provider's Name   [facility]   Medical Record No.  [id number]     Onset Date: 07/14/23  Start of Care Date: 07/21/23     Medical Diagnosis:  lumbar spinal stenosis      PT Treatment Diagnosis:  lumbar spinal stenosis Plan of Treatment  Frequency/Duration: 1 time a week/ 6    Certification date from 09/15/23 to 10/24/23         See note for plan of treatment details and functional goals     Adrian Cardenas, PT                         I CERTIFY THE NEED FOR THESE SERVICES FURNISHED UNDER        THIS PLAN OF TREATMENT AND WHILE UNDER MY CARE     (Physician attestation of this document indicates review and certification of the therapy plan).                Referring Provider:  Cristhian Vera      Initial Assessment  See Epic Evaluation- Start of Care Date: 07/21/23

## 2023-09-26 ENCOUNTER — THERAPY VISIT (OUTPATIENT)
Dept: PHYSICAL THERAPY | Facility: CLINIC | Age: 78
End: 2023-09-26
Payer: MEDICARE

## 2023-09-26 DIAGNOSIS — M48.061 SPINAL STENOSIS OF LUMBAR REGION WITHOUT NEUROGENIC CLAUDICATION: Primary | ICD-10-CM

## 2023-09-26 PROCEDURE — 97110 THERAPEUTIC EXERCISES: CPT | Mod: GP

## 2023-11-01 DIAGNOSIS — M48.061 SPINAL STENOSIS, LUMBAR REGION, WITHOUT NEUROGENIC CLAUDICATION: ICD-10-CM

## 2023-11-01 DIAGNOSIS — M51.16 LUMBAR DISC HERNIATION WITH RADICULOPATHY: ICD-10-CM

## 2023-11-03 RX ORDER — PREDNISONE 10 MG/1
10 TABLET ORAL EVERY MORNING
Qty: 10 TABLET | Refills: 0 | Status: SHIPPED | OUTPATIENT
Start: 2023-11-03

## 2023-11-08 ENCOUNTER — PATIENT OUTREACH (OUTPATIENT)
Dept: CARE COORDINATION | Facility: CLINIC | Age: 78
End: 2023-11-08
Payer: MEDICARE

## 2023-11-22 ENCOUNTER — PATIENT OUTREACH (OUTPATIENT)
Dept: CARE COORDINATION | Facility: CLINIC | Age: 78
End: 2023-11-22
Payer: MEDICARE

## 2024-01-19 ENCOUNTER — TRANSCRIBE ORDERS (OUTPATIENT)
Dept: OTHER | Age: 79
End: 2024-01-19

## 2024-01-19 DIAGNOSIS — M54.50 LOW BACK PAIN: Primary | ICD-10-CM

## 2024-02-06 DIAGNOSIS — E03.9 ACQUIRED HYPOTHYROIDISM: ICD-10-CM

## 2024-02-07 RX ORDER — LEVOTHYROXINE SODIUM 50 UG/1
50 TABLET ORAL DAILY
Qty: 90 TABLET | Refills: 0 | Status: SHIPPED | OUTPATIENT
Start: 2024-02-07 | End: 2024-07-08

## 2024-02-23 ENCOUNTER — THERAPY VISIT (OUTPATIENT)
Dept: PHYSICAL THERAPY | Facility: CLINIC | Age: 79
End: 2024-02-23
Payer: MEDICARE

## 2024-02-23 DIAGNOSIS — M54.50 ACUTE BILATERAL LOW BACK PAIN WITHOUT SCIATICA: Primary | ICD-10-CM

## 2024-02-23 PROCEDURE — 97161 PT EVAL LOW COMPLEX 20 MIN: CPT | Mod: GP

## 2024-02-23 NOTE — PROGRESS NOTES
PHYSICAL THERAPY EVALUATION  Type of Visit: Evaluation    See electronic medical record for Abuse and Falls Screening details.    Subjective       Presenting condition or subjective complaint: Pt reports having R side and flank pain 1/23 that she went to the ED for. She had testing done for kidney stones but she did not have any. Pain has since resolved. No mechanical pain in the area at this time and low back is doing fine. She reports increased weakness in her legs and decrease balance as she has become less compliant with her HEP that she was given last fall.     Date of onset: 01/23/24    Relevant medical history:   see problem list      Prior diagnostic imaging/testing results: X-ray       Prior therapy history for the same diagnosis, illness or injury: discharged from PT in late Sept 2023 for same diagnosis            Patient goals for therapy: improve leg strength and balance, avoid LBP      Pain assessment: See objective evaluation for additional pain details     Objective   LUMBAR SPINE EVALUATION  PAIN: Pain is Exacerbated By: no LBP or side pain during today's exam  INTEGUMENTARY (edema, incisions): WNL  POSTURE:  FHRS  GAIT: unsteady, mild loss of balance, narrow CARLOS A, flexed forward  BALANCE/PROPRIOCEPTION:  unable to hold tandem stance B  ROM:  gross decreased lumbar and thoracic AROM 50% all planes  STRENGTH:  hip strength 3-/5 B, R quad and HS 4+/5, L quad and HS 4-/5  LUMBAR/HIP Special Tests: WNL no side or flank pain with today's testing  PELVIS/SI SPECIAL TESTS: WNL  FUNCTIONAL TESTS:  STS- require use of hands to stand up, narrow CARLOS A, weight shift to R  PALPATION: WNL  SPINAL SEGMENTAL CONCLUSIONS: hypomobility throughout thoracic and lumbar spine      Assessment & Plan   CLINICAL IMPRESSIONS  Medical Diagnosis: Chronic LBP    Treatment Diagnosis: Chronic LBP, BLE weakness   Impression/Assessment: Patient is a 78 year old female with LBP and LE weakness complaints.  The following significant  findings have been identified: Pain, Decreased ROM/flexibility, Decreased joint mobility, Decreased strength, Impaired balance, Decreased proprioception, Impaired gait, Impaired muscle performance, and Decreased activity tolerance. These impairments interfere with their ability to perform self care tasks, household chores, household mobility, and community mobility as compared to previous level of function.     Clinical Decision Making (Complexity):  Clinical Presentation: Unstable/Unpredictable   Clinical Presentation Rationale: based on medical and personal factors listed in PT evaluation  Clinical Decision Making (Complexity): Low complexity    PLAN OF CARE  Treatment Interventions:  Interventions: Manual Therapy, Neuromuscular Re-education, Therapeutic Activity, Therapeutic Exercise    Long Term Goals     PT Goal 1  Goal Identifier: LTG  Goal Description: Pt will continue to be free of any side or flank pain with ADLs and will be able to hold tandem stance x 10 sec B.  Target Date: 04/07/24      Frequency of Treatment: 1-2x/week  Duration of Treatment: 6 weeks          Risks and benefits of evaluation/treatment have been explained.   Patient/Family/caregiver agrees with Plan of Care.     Evaluation Time:     PT Eval, Low Complexity Minutes (30991): 30     Signing Clinician: Adrian Cardenas PT      [facility]                                                                                   OUTPATIENT PHYSICAL THERAPY      PLAN OF TREATMENT FOR OUTPATIENT REHABILITATION   Patient's Last Name, First Name, Emerald Tavares YOB: 1945   Provider's Name   [facility]   Medical Record No.  [id number]     Onset Date: 01/23/24  Start of Care Date: 02/23/24     Medical Diagnosis:  Chronic LBP      PT Treatment Diagnosis:  Chronic LBP, BLE weakness Plan of Treatment  Frequency/Duration: 1-2x/week/ 6 weeks    Certification date from 02/25/24 to  04/07/24         See note for plan of treatment details and functional goals     Adrian Cardenas, PT                         I CERTIFY THE NEED FOR THESE SERVICES FURNISHED UNDER        THIS PLAN OF TREATMENT AND WHILE UNDER MY CARE     (Physician attestation of this document indicates review and certification of the therapy plan).              Referring Provider:  Jim Fonseca    Initial Assessment  See Epic Evaluation- Start of Care Date: 02/23/24

## 2024-02-27 ENCOUNTER — THERAPY VISIT (OUTPATIENT)
Dept: PHYSICAL THERAPY | Facility: CLINIC | Age: 79
End: 2024-02-27
Payer: MEDICARE

## 2024-02-27 DIAGNOSIS — M54.50 ACUTE BILATERAL LOW BACK PAIN WITHOUT SCIATICA: Primary | ICD-10-CM

## 2024-02-27 PROCEDURE — 97140 MANUAL THERAPY 1/> REGIONS: CPT | Mod: GP

## 2024-02-27 PROCEDURE — 97110 THERAPEUTIC EXERCISES: CPT | Mod: GP

## 2024-03-01 ENCOUNTER — THERAPY VISIT (OUTPATIENT)
Dept: PHYSICAL THERAPY | Facility: CLINIC | Age: 79
End: 2024-03-01
Payer: MEDICARE

## 2024-03-01 DIAGNOSIS — M54.50 ACUTE BILATERAL LOW BACK PAIN WITHOUT SCIATICA: Primary | ICD-10-CM

## 2024-03-01 PROCEDURE — 97110 THERAPEUTIC EXERCISES: CPT | Mod: GP

## 2024-03-05 ENCOUNTER — THERAPY VISIT (OUTPATIENT)
Dept: PHYSICAL THERAPY | Facility: CLINIC | Age: 79
End: 2024-03-05
Payer: MEDICARE

## 2024-03-05 DIAGNOSIS — M51.16 LUMBAR DISC HERNIATION WITH RADICULOPATHY: Primary | ICD-10-CM

## 2024-03-05 PROCEDURE — 97110 THERAPEUTIC EXERCISES: CPT | Mod: GP

## 2024-03-08 ENCOUNTER — THERAPY VISIT (OUTPATIENT)
Dept: PHYSICAL THERAPY | Facility: CLINIC | Age: 79
End: 2024-03-08
Payer: MEDICARE

## 2024-03-08 DIAGNOSIS — M51.16 LUMBAR DISC HERNIATION WITH RADICULOPATHY: Primary | ICD-10-CM

## 2024-03-08 PROCEDURE — 97110 THERAPEUTIC EXERCISES: CPT | Mod: GP

## 2024-03-12 ENCOUNTER — THERAPY VISIT (OUTPATIENT)
Dept: PHYSICAL THERAPY | Facility: CLINIC | Age: 79
End: 2024-03-12
Payer: MEDICARE

## 2024-03-12 DIAGNOSIS — M54.50 ACUTE BILATERAL LOW BACK PAIN WITHOUT SCIATICA: Primary | ICD-10-CM

## 2024-03-12 PROCEDURE — 97110 THERAPEUTIC EXERCISES: CPT | Mod: GP

## 2024-03-15 ENCOUNTER — THERAPY VISIT (OUTPATIENT)
Dept: PHYSICAL THERAPY | Facility: CLINIC | Age: 79
End: 2024-03-15
Payer: MEDICARE

## 2024-03-15 ENCOUNTER — TRANSFERRED RECORDS (OUTPATIENT)
Dept: HEALTH INFORMATION MANAGEMENT | Facility: CLINIC | Age: 79
End: 2024-03-15

## 2024-03-15 DIAGNOSIS — M54.50 ACUTE BILATERAL LOW BACK PAIN WITHOUT SCIATICA: Primary | ICD-10-CM

## 2024-03-15 PROCEDURE — 97530 THERAPEUTIC ACTIVITIES: CPT | Mod: GP | Performed by: PHYSICAL THERAPIST

## 2024-03-15 NOTE — CONFIDENTIAL NOTE
"S:  Pt reports that she has a headache today. Balance is a bit better today. Still not having flank pain/LBP.   Significant time spent today discussing her overall changes and goals to be independent with HEP to increase strength and balance.      A:  Pt is responding well to PT progressing well and has no current back pain.  Pt is scheduled for additional appts for follow up.  Pt wanted to add additional appts at this time because her order stated 13.  PT discussed that if things continue to feel better and shows good understanding of the HEP that the additional appointments will probably not be needed and she could continue self directed at that point if she felt comfortable.  Pt became slightly agitated and concerned that we were kicking her out.  A discussion followed repeated the goals of PT and how she is progressing.  She stated at this time that \"her work was not done here.\"  When asked what was meant by this she mentioned she enjoyed coming here and talking with us.  Brief discussion occurred again regarding PT and expectations for session.   P: follow up, reassess balance, LE strength.  Review HEP.     "

## 2024-03-19 ENCOUNTER — THERAPY VISIT (OUTPATIENT)
Dept: PHYSICAL THERAPY | Facility: CLINIC | Age: 79
End: 2024-03-19
Payer: MEDICARE

## 2024-03-19 DIAGNOSIS — M54.50 ACUTE BILATERAL LOW BACK PAIN WITHOUT SCIATICA: Primary | ICD-10-CM

## 2024-03-19 PROCEDURE — 97110 THERAPEUTIC EXERCISES: CPT | Mod: GP

## 2024-03-22 ENCOUNTER — THERAPY VISIT (OUTPATIENT)
Dept: PHYSICAL THERAPY | Facility: CLINIC | Age: 79
End: 2024-03-22
Payer: MEDICARE

## 2024-03-22 DIAGNOSIS — M54.50 ACUTE BILATERAL LOW BACK PAIN WITHOUT SCIATICA: Primary | ICD-10-CM

## 2024-03-22 PROCEDURE — 97110 THERAPEUTIC EXERCISES: CPT | Mod: GP

## 2024-03-26 ENCOUNTER — THERAPY VISIT (OUTPATIENT)
Dept: PHYSICAL THERAPY | Facility: CLINIC | Age: 79
End: 2024-03-26
Payer: MEDICARE

## 2024-03-26 DIAGNOSIS — M54.50 ACUTE BILATERAL LOW BACK PAIN WITHOUT SCIATICA: Primary | ICD-10-CM

## 2024-03-26 PROCEDURE — 97110 THERAPEUTIC EXERCISES: CPT | Mod: GP

## 2024-03-29 ENCOUNTER — THERAPY VISIT (OUTPATIENT)
Dept: PHYSICAL THERAPY | Facility: CLINIC | Age: 79
End: 2024-03-29
Payer: MEDICARE

## 2024-03-29 DIAGNOSIS — M54.50 ACUTE BILATERAL LOW BACK PAIN WITHOUT SCIATICA: Primary | ICD-10-CM

## 2024-03-29 PROCEDURE — 97110 THERAPEUTIC EXERCISES: CPT | Mod: GP

## 2024-03-29 NOTE — PROGRESS NOTES
DISCHARGE  Reason for Discharge: Patient has met all goals.  No further expectation of progress.    Equipment Issued: HEP    Discharge Plan: Patient to continue home program.    Referring Provider:  Jim Fonseca

## 2024-04-08 DIAGNOSIS — E03.9 ACQUIRED HYPOTHYROIDISM: ICD-10-CM

## 2024-04-08 RX ORDER — LEVOTHYROXINE SODIUM 50 UG/1
50 TABLET ORAL DAILY
Qty: 90 TABLET | Refills: 0 | OUTPATIENT
Start: 2024-04-08

## 2024-05-30 ENCOUNTER — TRANSFERRED RECORDS (OUTPATIENT)
Dept: HEALTH INFORMATION MANAGEMENT | Facility: CLINIC | Age: 79
End: 2024-05-30
Payer: MEDICARE

## 2024-06-04 ENCOUNTER — PATIENT OUTREACH (OUTPATIENT)
Dept: CARE COORDINATION | Facility: CLINIC | Age: 79
End: 2024-06-04
Payer: MEDICARE

## 2024-07-06 DIAGNOSIS — E03.9 ACQUIRED HYPOTHYROIDISM: ICD-10-CM

## 2024-07-08 ENCOUNTER — TRANSFERRED RECORDS (OUTPATIENT)
Dept: HEALTH INFORMATION MANAGEMENT | Facility: CLINIC | Age: 79
End: 2024-07-08

## 2024-07-08 RX ORDER — LEVOTHYROXINE SODIUM 50 UG/1
TABLET ORAL
Qty: 90 TABLET | Refills: 0 | Status: SHIPPED | OUTPATIENT
Start: 2024-07-08 | End: 2024-10-03

## 2024-07-10 ENCOUNTER — TELEPHONE (OUTPATIENT)
Dept: FAMILY MEDICINE | Facility: CLINIC | Age: 79
End: 2024-07-10
Payer: MEDICARE

## 2024-07-10 NOTE — TELEPHONE ENCOUNTER
FYI - Status Update    Who is Calling: patient    Update: After seeing Dr. Vera pt would like him to be PCP but 1st available appt is 9/9 which she did schedule but wanted him to know next week at [facility] pt will be having work done on neck    Does caller want a call/response back: Yes     Okay to leave a detailed message?: Yes at Cell number on file:    Telephone Information:   Mobile [phone]

## 2024-08-11 ENCOUNTER — TELEPHONE (OUTPATIENT)
Dept: FAMILY MEDICINE | Facility: CLINIC | Age: 79
End: 2024-08-11

## 2024-08-11 NOTE — TELEPHONE ENCOUNTER
Medication Question or Refill    Contacts       Contact Date/Time Type Contact Phone/Fax    08/11/2024 03:32 PM CDT Phone (Incoming) Emerald Kulkarni (Self) [phone] (H)            What medication are you calling about (include dose and sig)?: gabapentin (NEURONTIN) 300 MG capsule    Preferred Pharmacy:   [id number] - [place] [address]  Phone: [phone] Fax: [phone]      Controlled Substance Agreement on file:   CSA -- Patient Level:    CSA: None found at the patient level.       Who prescribed the medication?: Jim Fonseca- Orthopaedic Surgery     Do you need a refill? Yes    When did you use the medication last? Sciatic pain. Have about 7 pills left, and she takes 2 a day.    Patient offered an appointment? No    Do you have any questions or concerns?  Yes: The patient called today asking for a refill of the gabapentin 300 MG capsule.  The patient has about 7 pills left, taken two times a day.  The patient states running out of this medication soon.      Okay to leave a detailed message?: Yes at Cell number on file:    Telephone Information:   Mobile [phone]

## 2024-08-12 ENCOUNTER — TRANSCRIBE ORDERS (OUTPATIENT)
Dept: OTHER | Age: 79
End: 2024-08-12

## 2024-08-12 DIAGNOSIS — R41.3 MEMORY DEFICIT: Primary | ICD-10-CM

## 2024-08-15 ENCOUNTER — TRANSFERRED RECORDS (OUTPATIENT)
Dept: HEALTH INFORMATION MANAGEMENT | Facility: CLINIC | Age: 79
End: 2024-08-15
Payer: MEDICARE

## 2024-09-09 ENCOUNTER — OFFICE VISIT (OUTPATIENT)
Dept: FAMILY MEDICINE | Facility: CLINIC | Age: 79
End: 2024-09-09
Payer: MEDICARE

## 2024-09-09 ENCOUNTER — TELEPHONE (OUTPATIENT)
Dept: FAMILY MEDICINE | Facility: CLINIC | Age: 79
End: 2024-09-09

## 2024-09-09 VITALS
OXYGEN SATURATION: 96 % | WEIGHT: 193 LBS | DIASTOLIC BLOOD PRESSURE: 98 MMHG | RESPIRATION RATE: 22 BRPM | BODY MASS INDEX: 34.2 KG/M2 | HEART RATE: 90 BPM | SYSTOLIC BLOOD PRESSURE: 154 MMHG | TEMPERATURE: 98.5 F | HEIGHT: 63 IN

## 2024-09-09 DIAGNOSIS — E66.01 MORBID OBESITY (H): ICD-10-CM

## 2024-09-09 DIAGNOSIS — L40.9 SCALP PSORIASIS: ICD-10-CM

## 2024-09-09 DIAGNOSIS — I25.118 CORONARY ARTERY DISEASE OF NATIVE ARTERY OF NATIVE HEART WITH STABLE ANGINA PECTORIS (H): ICD-10-CM

## 2024-09-09 DIAGNOSIS — N64.4 PAIN OF RIGHT BREAST: Primary | ICD-10-CM

## 2024-09-09 DIAGNOSIS — R73.9 HYPERGLYCEMIA: ICD-10-CM

## 2024-09-09 PROBLEM — I20.89 ANGINAL EQUIVALENT (H): Status: ACTIVE | Noted: 2024-09-09

## 2024-09-09 LAB
ALBUMIN SERPL BCG-MCNC: 4.6 G/DL (ref 3.5–5.2)
ALP SERPL-CCNC: 97 U/L (ref 40–150)
ALT SERPL W P-5'-P-CCNC: 21 U/L (ref 0–50)
ANION GAP SERPL CALCULATED.3IONS-SCNC: 13 MMOL/L (ref 7–15)
AST SERPL W P-5'-P-CCNC: 23 U/L (ref 0–45)
BASOPHILS # BLD AUTO: 0.1 10E3/UL (ref 0–0.2)
BASOPHILS NFR BLD AUTO: 1 %
BILIRUB SERPL-MCNC: 0.2 MG/DL
BUN SERPL-MCNC: 19.6 MG/DL (ref 8–23)
CALCIUM SERPL-MCNC: 10 MG/DL (ref 8.8–10.4)
CHLORIDE SERPL-SCNC: 99 MMOL/L (ref 98–107)
CREAT SERPL-MCNC: 0.69 MG/DL (ref 0.51–0.95)
EGFRCR SERPLBLD CKD-EPI 2021: 87 ML/MIN/1.73M2
EOSINOPHIL # BLD AUTO: 0.2 10E3/UL (ref 0–0.7)
EOSINOPHIL NFR BLD AUTO: 3 %
ERYTHROCYTE [DISTWIDTH] IN BLOOD BY AUTOMATED COUNT: 12.5 % (ref 10–15)
GLUCOSE SERPL-MCNC: 99 MG/DL (ref 70–99)
HBA1C MFR BLD: 5.6 % (ref 0–5.6)
HCO3 SERPL-SCNC: 26 MMOL/L (ref 22–29)
HCT VFR BLD AUTO: 42.1 % (ref 35–47)
HGB BLD-MCNC: 14.2 G/DL (ref 11.7–15.7)
IMM GRANULOCYTES # BLD: 0 10E3/UL
IMM GRANULOCYTES NFR BLD: 0 %
LYMPHOCYTES # BLD AUTO: 2.3 10E3/UL (ref 0.8–5.3)
LYMPHOCYTES NFR BLD AUTO: 30 %
MCH RBC QN AUTO: 30 PG (ref 26.5–33)
MCHC RBC AUTO-ENTMCNC: 33.7 G/DL (ref 31.5–36.5)
MCV RBC AUTO: 89 FL (ref 78–100)
MONOCYTES # BLD AUTO: 0.5 10E3/UL (ref 0–1.3)
MONOCYTES NFR BLD AUTO: 7 %
NEUTROPHILS # BLD AUTO: 4.5 10E3/UL (ref 1.6–8.3)
NEUTROPHILS NFR BLD AUTO: 60 %
PLATELET # BLD AUTO: 253 10E3/UL (ref 150–450)
POTASSIUM SERPL-SCNC: 4.1 MMOL/L (ref 3.4–5.3)
PROT SERPL-MCNC: 7.8 G/DL (ref 6.4–8.3)
RBC # BLD AUTO: 4.74 10E6/UL (ref 3.8–5.2)
SODIUM SERPL-SCNC: 138 MMOL/L (ref 135–145)
WBC # BLD AUTO: 7.5 10E3/UL (ref 4–11)

## 2024-09-09 PROCEDURE — 36415 COLL VENOUS BLD VENIPUNCTURE: CPT | Performed by: INTERNAL MEDICINE

## 2024-09-09 PROCEDURE — 99214 OFFICE O/P EST MOD 30 MIN: CPT | Performed by: INTERNAL MEDICINE

## 2024-09-09 PROCEDURE — G2211 COMPLEX E/M VISIT ADD ON: HCPCS | Performed by: INTERNAL MEDICINE

## 2024-09-09 PROCEDURE — 85025 COMPLETE CBC W/AUTO DIFF WBC: CPT | Performed by: INTERNAL MEDICINE

## 2024-09-09 PROCEDURE — 83036 HEMOGLOBIN GLYCOSYLATED A1C: CPT | Performed by: INTERNAL MEDICINE

## 2024-09-09 PROCEDURE — 80053 COMPREHEN METABOLIC PANEL: CPT | Performed by: INTERNAL MEDICINE

## 2024-09-09 RX ORDER — FLUOCINONIDE TOPICAL SOLUTION USP, 0.05% 0.5 MG/ML
SOLUTION TOPICAL DAILY
Qty: 20 ML | Refills: 0 | Status: SHIPPED | OUTPATIENT
Start: 2024-09-09

## 2024-09-09 ASSESSMENT — PAIN SCALES - GENERAL: PAINLEVEL: NO PAIN (0)

## 2024-09-09 NOTE — TELEPHONE ENCOUNTER
Patient calling and has a scheduled appointment today, and patient is lost. Assisted patient with directions to clinic and notified provider, late. Provider ok with patient and spouse running late through teams chat.    Future Appointments 9/9/2024 - 3/8/2025        Date Visit Type Length Department Provider     9/9/2024  2:30 PM OFFICE VISIT 30 min [facility]K FP/IM/Cristhian Gamble MD    Location Instructions:     [facility] is located at [address] less than a mile north of the [facility] exit off [address]. Free parking is available; access the lot by turning east from [facility] onto [address].                   Sarah Stephens RN

## 2024-09-09 NOTE — PROGRESS NOTES
[facility] Internal Medicine Progress Note           Assessment and Plan:     Pain of right breast  - US Breast Right Limited 1-3 Quadrants; Future  - MA Diagnostic Bilateral w/Amor; Future    Coronary artery disease of native artery of native heart with stable angina pectoris (H24)  - Echocardiogram Complete; Future  - CBC with platelets and differential    Hyperglycemia  - Hemoglobin A1c  - Comprehensive metabolic panel    Morbid obesity (H)  - REVIEW OF HEALTH MAINTENANCE PROTOCOL ORDERS    Scalp psoriasis  - fluocinonide (LIDEX) 0.05 % external solution; Apply topically daily.          Interval History:   Reason for visit: right breast pain`  Onset: acute  Course: same  Intensity: mild  Associated symptoms: none  History: yes  Evaluation: none  Precipitating factor: postmenopausal  Alleviating factor: none  Complications: none  Therapies tried and outcome: none            Significant Problems:     Patient Active Problem List   Diagnosis    Psoriasis    Hx of melanoma excision    Chronic constipation    History of diverticulitis    Left renal mass    Heart murmur: Benign ?AS    Heart failure with preserved ejection fraction, NYHA class IV (H)    Morbid obesity (H)    Complex renal cyst    Urinary urgency    Sigmoid diverticulitis    Primary osteoarthritis of left hand    PAD (peripheral artery disease) (H24)    Osteoarthrosis    MIRELLA (obstructive sleep apnea)    Memory impairment    Left lumbar radiculopathy    Intermittent small bowel obstruction due to adhesions (H)    Hyperlipidemia    Hearing loss    Gastroesophageal reflux disease without esophagitis    Diverticulosis of colon    Displacement of lumbar intervertebral disc without myelopathy    COVID-19    CAD (coronary artery disease)    Chronic obstructive pulmonary disease (H)    Chronic CHF (congestive heart failure) (H)    Anemia    Age-related macular degeneration    Acute lower gastrointestinal bleeding    Acute left-sided low back pain  "without sciatica    Anginal equivalent (H24)              Review of Systems:     CONSTITUTIONAL: NEGATIVE for fever, chills, change in weight  INTEGUMENTARY/SKIN: POSITIVE for scalp psoriasis.  EYES: NEGATIVE for vision changes or irritation  ENT/MOUTH: NEGATIVE for ear, mouth and throat problems  RESP: NEGATIVE for significant cough or SOB  BREAST: As above.  CV: NEGATIVE for chest pain, palpitations or peripheral edema  CV: POSITIVE for coronary artery diseae and NEGATIVE for palpitations and tachycardia  GI: NEGATIVE for nausea, abdominal pain, heartburn, or change in bowel habits  : NEGATIVE for frequency, dysuria, or hematuria  MUSCULOSKELETAL: NEGATIVE for significant arthralgias or myalgia  NEURO: NEGATIVE for weakness, dizziness or paresthesias  ENDOCRINE: POSITIVE  for hyperglycemia.  HEME: NEGATIVE for bleeding problems  PSYCHIATRIC: NEGATIVE for changes in mood or affect             Physical Exam:   BP (!) 154/98 (BP Location: Left arm, Patient Position: Sitting, Cuff Size: Adult Large)   Pulse 90   Temp 98.5  F (36.9  C) (Temporal)   Resp 22   Ht 1.6 m (5' 3\")   Wt 87.5 kg (193 lb)   SpO2 96%   BMI 34.19 kg/m     Constitutional:   fatigued, alert, cooperative, no apparent distress, appears stated age, and moderately obese     Eyes:   extra-ocular muscles intact and sclera clear     ENT:   normocephalic, without obvious abnormality     Lungs:   no increased work of breathing, good air exchange, no retractions, and clear to auscultation     Cardiovascular:   regular rate and rhythm     Chest / Breast:   breasts symmetrical, skin without lesion, no nipple retraction or dimpling, no masses palpated, and no axillary or supraclavicular adenopathy         Neurologic:   Motor Exam:  moves all extremities well and symmetrically  Sensory:  Sensory intact     Neuropsychiatric:   Affect: normal             Data:   Epic reviewed.     Disposition:  Follow-up in 4 weeks or as needed.    Cristhian Vera, " MD  Internal Medicine  [facility] Team

## 2024-09-18 NOTE — ADDENDUM NOTE
Addended by: ABDULAZIZ SMITH on: 9/18/2024 06:39 AM     Modules accepted: Orders, Level of Service

## 2024-10-02 DIAGNOSIS — E03.9 ACQUIRED HYPOTHYROIDISM: ICD-10-CM

## 2024-10-03 RX ORDER — LEVOTHYROXINE SODIUM 50 UG/1
50 TABLET ORAL DAILY
Qty: 90 TABLET | Refills: 1 | Status: SHIPPED | OUTPATIENT
Start: 2024-10-03 | End: 2024-10-08

## 2024-10-08 ENCOUNTER — ANCILLARY PROCEDURE (OUTPATIENT)
Dept: MAMMOGRAPHY | Facility: CLINIC | Age: 79
End: 2024-10-08
Attending: INTERNAL MEDICINE
Payer: MEDICARE

## 2024-10-08 DIAGNOSIS — E03.9 ACQUIRED HYPOTHYROIDISM: ICD-10-CM

## 2024-10-08 DIAGNOSIS — N64.4 PAIN OF RIGHT BREAST: ICD-10-CM

## 2024-10-08 PROCEDURE — 76642 ULTRASOUND BREAST LIMITED: CPT | Mod: RT | Performed by: RADIOLOGY

## 2024-10-08 PROCEDURE — 77066 DX MAMMO INCL CAD BI: CPT | Performed by: RADIOLOGY

## 2024-10-08 PROCEDURE — G0279 TOMOSYNTHESIS, MAMMO: HCPCS | Performed by: RADIOLOGY

## 2024-10-08 NOTE — TELEPHONE ENCOUNTER
Medication Question or Refill        What medication are you calling about (include dose and sig)?: levothyroxine (SYNTHROID/LEVOTHROID) 50 MCG tablet     Preferred Pharmacy:  [id number] - [place] - [address] [place], [place] [address]th [facility]  [place] [address]  Phone: [phone] Fax: [phone]      Controlled Substance Agreement on file:   CSA -- Patient Level:    CSA: None found at the patient level.       Who prescribed the medication?: VocalCristhian MD     Do you need a refill? Yes    Patient offered an appointment? Yes: patient scheduled for appointment on 12/02/2024    Do you have any questions or concerns?  Yes: Patient states she had her   this script and he had a blood infection and had to go back to the hospital, so patient never received this medication from her , and is unsure what happened to it. Patient is requesting a refill.       Okay to leave a detailed message?: Yes at Cell number on file:    No relevant phone numbers on file.

## 2024-10-09 RX ORDER — LEVOTHYROXINE SODIUM 50 UG/1
50 TABLET ORAL DAILY
Qty: 90 TABLET | Refills: 1 | Status: SHIPPED | OUTPATIENT
Start: 2024-10-09

## 2024-11-14 DIAGNOSIS — M79.2 CHRONIC NEUROPATHIC PAIN: Primary | ICD-10-CM

## 2024-11-14 DIAGNOSIS — G89.29 CHRONIC NEUROPATHIC PAIN: Primary | ICD-10-CM

## 2024-11-14 RX ORDER — GABAPENTIN 300 MG/1
300 CAPSULE ORAL 2 TIMES DAILY PRN
Qty: 60 CAPSULE | Refills: 5 | Status: SHIPPED | OUTPATIENT
Start: 2024-11-14

## 2024-11-14 NOTE — TELEPHONE ENCOUNTER
Medication Question or Refill    Contacts       Contact Date/Time Type Contact Phone/Fax    11/14/2024 02:08 PM CST Phone (Incoming) Emerald Kulkarni (Self) [phone] (H)            What medication are you calling about (include dose and sig)?: gabapentin (NEURONTIN) 300 MG capsule     Preferred Pharmacy:  [id number] - [place] [address]  Phone: [phone] Fax: [phone]      Controlled Substance Agreement on file:   CSA -- Patient Level:    CSA: None found at the patient level.       Who prescribed the medication?: Outside Dr/New to Dr Vocal    Do you need a refill? Yes    Patient offered an appointment? No Patient has an appt on 12/2/2024    Do you have any questions or concerns?  No    Okay to leave a detailed message?: Yes at Home number on file [phone] (home)    Nadya Lu [facility]   Primary Care

## 2024-11-27 ENCOUNTER — TRANSFERRED RECORDS (OUTPATIENT)
Dept: HEALTH INFORMATION MANAGEMENT | Facility: CLINIC | Age: 79
End: 2024-11-27
Payer: MEDICARE

## 2024-12-02 ENCOUNTER — OFFICE VISIT (OUTPATIENT)
Dept: FAMILY MEDICINE | Facility: CLINIC | Age: 79
End: 2024-12-02
Payer: MEDICARE

## 2024-12-02 ENCOUNTER — ANCILLARY PROCEDURE (OUTPATIENT)
Dept: GENERAL RADIOLOGY | Facility: CLINIC | Age: 79
End: 2024-12-02
Attending: INTERNAL MEDICINE
Payer: MEDICARE

## 2024-12-02 VITALS
DIASTOLIC BLOOD PRESSURE: 103 MMHG | RESPIRATION RATE: 20 BRPM | SYSTOLIC BLOOD PRESSURE: 160 MMHG | BODY MASS INDEX: 33.59 KG/M2 | HEART RATE: 103 BPM | OXYGEN SATURATION: 97 % | TEMPERATURE: 97.4 F | WEIGHT: 189.6 LBS

## 2024-12-02 DIAGNOSIS — M54.6 CHRONIC BILATERAL THORACIC BACK PAIN: Primary | ICD-10-CM

## 2024-12-02 DIAGNOSIS — M54.2 NECK PAIN, CHRONIC: Primary | ICD-10-CM

## 2024-12-02 DIAGNOSIS — G89.29 CHRONIC BILATERAL THORACIC BACK PAIN: Primary | ICD-10-CM

## 2024-12-02 DIAGNOSIS — S29.012A STRAIN OF LEFT RHOMBOID MUSCLE: ICD-10-CM

## 2024-12-02 DIAGNOSIS — I11.0 HYPERTENSIVE HEART DISEASE WITH HEART FAILURE (H): ICD-10-CM

## 2024-12-02 DIAGNOSIS — M54.2 NECK PAIN, CHRONIC: ICD-10-CM

## 2024-12-02 DIAGNOSIS — S46.811S STRAIN OF RIGHT TRAPEZIUS MUSCLE, SEQUELA: ICD-10-CM

## 2024-12-02 DIAGNOSIS — S46.812S TRAPEZIUS STRAIN, LEFT, SEQUELA: ICD-10-CM

## 2024-12-02 DIAGNOSIS — M41.84 OTHER FORM OF SCOLIOSIS OF THORACIC SPINE: ICD-10-CM

## 2024-12-02 DIAGNOSIS — I73.9 PAD (PERIPHERAL ARTERY DISEASE) (H): ICD-10-CM

## 2024-12-02 DIAGNOSIS — G89.29 NECK PAIN, CHRONIC: ICD-10-CM

## 2024-12-02 DIAGNOSIS — G89.29 CHRONIC BILATERAL THORACIC BACK PAIN: ICD-10-CM

## 2024-12-02 DIAGNOSIS — M54.6 CHRONIC BILATERAL THORACIC BACK PAIN: ICD-10-CM

## 2024-12-02 DIAGNOSIS — J44.9 CHRONIC OBSTRUCTIVE PULMONARY DISEASE, UNSPECIFIED COPD TYPE (H): ICD-10-CM

## 2024-12-02 DIAGNOSIS — E03.9 HYPOTHYROIDISM, UNSPECIFIED TYPE: ICD-10-CM

## 2024-12-02 DIAGNOSIS — E66.01 MORBID OBESITY (H): ICD-10-CM

## 2024-12-02 LAB
T4 FREE SERPL-MCNC: 1.24 NG/DL (ref 0.9–1.7)
TSH SERPL DL<=0.005 MIU/L-ACNC: 1.33 UIU/ML (ref 0.3–4.2)

## 2024-12-02 PROCEDURE — 84439 ASSAY OF FREE THYROXINE: CPT | Performed by: INTERNAL MEDICINE

## 2024-12-02 PROCEDURE — 84443 ASSAY THYROID STIM HORMONE: CPT | Performed by: INTERNAL MEDICINE

## 2024-12-02 PROCEDURE — 36415 COLL VENOUS BLD VENIPUNCTURE: CPT | Performed by: INTERNAL MEDICINE

## 2024-12-02 PROCEDURE — 72040 X-RAY EXAM NECK SPINE 2-3 VW: CPT | Mod: TC | Performed by: PREVENTIVE MEDICINE

## 2024-12-02 PROCEDURE — 72070 X-RAY EXAM THORAC SPINE 2VWS: CPT | Mod: TC | Performed by: STUDENT IN AN ORGANIZED HEALTH CARE EDUCATION/TRAINING PROGRAM

## 2024-12-02 PROCEDURE — G2211 COMPLEX E/M VISIT ADD ON: HCPCS | Performed by: INTERNAL MEDICINE

## 2024-12-02 PROCEDURE — 99214 OFFICE O/P EST MOD 30 MIN: CPT | Performed by: INTERNAL MEDICINE

## 2024-12-02 ASSESSMENT — PAIN SCALES - GENERAL: PAINLEVEL_OUTOF10: MODERATE PAIN (4)

## 2024-12-02 NOTE — PROGRESS NOTES
"    Back Pain:  She presents for follow up of back pain. Patient's back pain is a recurring problem.  Location of back pain:  Right lower back, left lower back, right side of neck, left side of neck, right buttock, left buttock and left hip  Description of back pain: sharp  Back pain spreads: right buttocks, left buttocks, right shoulder, right side of neck and left side of neck  Since patient first noticed back pain, pain is: always present, but gets better and worse  Does back pain interfere with her job:  No     Headaches:   Since the patient's last clinic visit, headaches are: no change  The patient is getting headaches:  Every day  She is able to do normal daily activities when she has a migraine.  The patient is taking the following rescue/relief medications:  Tylenol and Excedrin   Patient states \"I get some relief\" from the rescue/relief medications.   The patient is taking the following medications to prevent migraines:  No medications to prevent migraines  In the past 4 weeks, the patient has gone to an Urgent Care or Emergency Room 0 times times due to headaches.  " "patient is taking the following rescue/relief medications:  Tylenol and Excedrin   Patient states \"I get some relief\" from the rescue/relief medications.   The patient is taking the following medications to prevent migraines:  No medications to prevent migraines  In the past 4 weeks, the patient has gone to an Urgent Care or Emergency Room 0 times times due to headaches.           Significant Problems:     Patient Active Problem List   Diagnosis    Psoriasis    Hx of melanoma excision    Chronic constipation    History of diverticulitis    Left renal mass    Heart murmur: Benign ?AS    Heart failure with preserved ejection fraction, NYHA class IV (H)    Morbid obesity (H)    Complex renal cyst    Urinary urgency    Sigmoid diverticulitis    Primary osteoarthritis of left hand    PAD (peripheral artery disease) (H)    Osteoarthrosis    MIRELLA (obstructive sleep apnea)    Memory impairment    Left lumbar radiculopathy    Intermittent small bowel obstruction due to adhesions (H)    Hyperlipidemia    Hearing loss    Gastroesophageal reflux disease without esophagitis    Diverticulosis of colon    Displacement of lumbar intervertebral disc without myelopathy    COVID-19    CAD (coronary artery disease)    Chronic obstructive pulmonary disease (H)    Chronic CHF (congestive heart failure) (H)    Anemia    Age-related macular degeneration    Acute lower gastrointestinal bleeding    Acute left-sided low back pain without sciatica    Anginal equivalent (H)              Review of Systems:     CONSTITUTIONAL:POSITIVE  for morbidobesity.  INTEGUMENTARY/SKIN: NEGATIVE for worrisome rashes, moles or lesions  EYES: NEGATIVE for vision changes or irritation  ENT/MOUTH: NEGATIVE for ear, mouth and throat problems  RESP: NEGATIVE for significant cough or SOB  CV: POSITIVE for hypertensive heart disease.  GI: NEGATIVE for nausea, abdominal pain, heartburn, or change in bowel habits  : NEGATIVE for frequency, dysuria, or " hematuria  MUSCULOSKELETAL: As above.  NEURO: NEGATIVE for weakness, dizziness or paresthesias  ENDOCRINE: NEGATIVE for temperature intolerance, skin/hair changes  HEME: NEGATIVE for bleeding problems  PSYCHIATRIC: NEGATIVE for changes in mood or affect             Physical Exam:   BP (!) 160/103 (BP Location: Left arm, Patient Position: Sitting, Cuff Size: Adult Large)   Pulse 103   Temp 97.4  F (36.3  C) (Temporal)   Resp 20   Wt 86 kg (189 lb 9.6 oz)   SpO2 97%   BMI 33.59 kg/m     Constitutional:   fatigued, alert, cooperative, no apparent distress, appears stated age, and moderately obese     Eyes:   extra-ocular muscles intact and sclera clear     ENT:   normocephalic, without obvious abnormality     Lungs:   no increased work of breathing, no retractions, and clear to auscultation     Cardiovascular:   regular rate and rhythm     Musculoskeletal:   Non-pitting edema on both lower extremities.     Neurologic:   Mental Status Exam:  Attention/Concentration:  normal  Motor Exam:  moves all extremities well and symmetrically     Neuropsychiatric:   Affect: normal             Data:   Epic reviewed.     Disposition:  Follow-up in 4 weeks or as needed.    Cristhian Vera MD  Internal Medicine  [facility] Team

## 2024-12-02 NOTE — NURSING NOTE
Patient Quality Outreach    Patient is due for the following:   Physical Annual Wellness Visit    Action(s) Taken:   Patient was scheduled for 12/16/24    Type of outreach:    Phone, spoke to patient/parent. patient    Questions for provider review:    None           Nina Stoddard MA

## 2024-12-03 ENCOUNTER — TELEPHONE (OUTPATIENT)
Dept: ANESTHESIOLOGY | Facility: CLINIC | Age: 79
End: 2024-12-03
Payer: MEDICARE

## 2024-12-03 NOTE — TELEPHONE ENCOUNTER
Reason for Referral:   Procedure Order            Procedure:   Muscle Injection            Type:   Trigger Point          Please call to schedule at [facility]        Colten Bharath  Complex   [facility]  Pain [facility]

## 2024-12-05 ENCOUNTER — TELEPHONE (OUTPATIENT)
Dept: ANESTHESIOLOGY | Facility: CLINIC | Age: 79
End: 2024-12-05
Payer: MEDICARE

## 2024-12-05 NOTE — TELEPHONE ENCOUNTER
RN called and spoke with patient to answer her questions related to the scheduled TPI's. Writer answered patient's questions in regards to length of pain relief and how many she can have in a year time frame. Patient voiced concern that she is anxious about needles and the procedure. Writer reassured this procedure is non invasive and minimal. The injection is performed into the muscles. Patient felt reassured and was informed she can further discuss with the provider at the appointment if she has concerns. Patient verbalized understanding and had no further questions.     Zahira Khan RN

## 2024-12-05 NOTE — TELEPHONE ENCOUNTER
Patient confirmed scheduled appointment:     Date: 12/30/24  Time: 1 PM  Visit type: Procedure  Visit mode: In Person  Provider: Dr. Mello Birmingham  [place] [facility]    Additional Notes: In-clinic TPIs

## 2024-12-16 ENCOUNTER — OFFICE VISIT (OUTPATIENT)
Dept: FAMILY MEDICINE | Facility: CLINIC | Age: 79
End: 2024-12-16
Payer: MEDICARE

## 2024-12-16 VITALS
TEMPERATURE: 97.2 F | DIASTOLIC BLOOD PRESSURE: 88 MMHG | WEIGHT: 190.2 LBS | RESPIRATION RATE: 20 BRPM | SYSTOLIC BLOOD PRESSURE: 138 MMHG | OXYGEN SATURATION: 96 % | HEIGHT: 63 IN | BODY MASS INDEX: 33.7 KG/M2 | HEART RATE: 91 BPM

## 2024-12-16 DIAGNOSIS — J44.9 CHRONIC OBSTRUCTIVE PULMONARY DISEASE, UNSPECIFIED COPD TYPE (H): ICD-10-CM

## 2024-12-16 DIAGNOSIS — E66.01 MORBID OBESITY (H): ICD-10-CM

## 2024-12-16 DIAGNOSIS — I35.0 AORTIC VALVE STENOSIS, ETIOLOGY OF CARDIAC VALVE DISEASE UNSPECIFIED: ICD-10-CM

## 2024-12-16 DIAGNOSIS — Z00.00 ENCOUNTER FOR MEDICARE ANNUAL WELLNESS EXAM: Primary | ICD-10-CM

## 2024-12-16 DIAGNOSIS — I25.10 CORONARY ARTERY DISEASE INVOLVING NATIVE CORONARY ARTERY OF NATIVE HEART WITHOUT ANGINA PECTORIS: ICD-10-CM

## 2024-12-16 PROCEDURE — G0439 PPPS, SUBSEQ VISIT: HCPCS | Performed by: INTERNAL MEDICINE

## 2024-12-16 RX ORDER — ASPIRIN 81 MG/1
81 TABLET ORAL DAILY PRN
COMMUNITY

## 2024-12-16 SDOH — HEALTH STABILITY: PHYSICAL HEALTH: ON AVERAGE, HOW MANY DAYS PER WEEK DO YOU ENGAGE IN MODERATE TO STRENUOUS EXERCISE (LIKE A BRISK WALK)?: 2 DAYS

## 2024-12-16 SDOH — HEALTH STABILITY: PHYSICAL HEALTH: ON AVERAGE, HOW MANY MINUTES DO YOU ENGAGE IN EXERCISE AT THIS LEVEL?: 20 MIN

## 2024-12-16 ASSESSMENT — PAIN SCALES - GENERAL: PAINLEVEL_OUTOF10: MILD PAIN (2)

## 2024-12-16 ASSESSMENT — SOCIAL DETERMINANTS OF HEALTH (SDOH): HOW OFTEN DO YOU GET TOGETHER WITH FRIENDS OR RELATIVES?: ONCE A WEEK

## 2024-12-16 NOTE — PROGRESS NOTES
Health Care Directive  Patient does not have a Health Care Directive: Discussed advance care planning with patient; however, patient declined at this time.      12/16/2024   General Health   How would you rate your overall physical health? Good   Feel stress (tense, anxious, or unable to sleep) Not at all            12/16/2024   Nutrition   Diet: Regular (no restrictions)            12/16/2024   Exercise   Days per week of moderate/strenous exercise 2 days   Average minutes spent exercising at this level 20 min      (!) EXERCISE CONCERN      12/16/2024   Social Factors   Frequency of gathering with friends or relatives Once a week   Worry food won't last until get money to buy more No   Food not last or not have enough money for food? No   Do you have housing? (Housing is defined as stable permanent housing and does not include staying ouside in a car, in a tent, in an abandoned building, in an overnight shelter, or couch-surfing.) Yes   Are you worried about losing your housing? No   Lack of transportation? No   Unable to get utilities (heat,electricity)? No            12/16/2024   Fall Risk   Fallen 2 or more times in the past year? Yes    Trouble with walking or balance? Yes    Gait Speed Test (Document in seconds) 4.09   Gait Speed Test Interpretation Less than or equal to 5.00 seconds - PASS       Patient-reported          12/16/2024   Activities of Daily Living- Home Safety   Needs help with the following daily activites None of the above   Safety concerns in the home None of the above            12/16/2024   Dental   Dentist two times every year? Yes            12/16/2024   Hearing Screening   Hearing concerns? None of the above            12/16/2024   Driving Risk Screening   Patient/family members have concerns about driving No            12/16/2024   General Alertness/Fatigue Screening   Have you been more tired than usual lately? No            12/16/2024   Urinary Incontinence Screening   Bothered by  leaking urine in past 6 months Yes            12/16/2024   TB Screening   Were you born outside of the [place]? No            Today's PHQ-2 Score:       12/16/2024     1:40 PM   PHQ-2 ( 1999 Pf[employer])   PHQ-2 Score Incomplete           12/16/2024   Substance Use   Alcohol more than 3/day or more than 7/wk Not Applicable   Do you have a current opioid prescription? No   How severe/bad is pain from 1 to 10? 5/10   Do you use any other substances recreationally? No        Social History     Tobacco Use    Smoking status: Never     Passive exposure: Never    Smokeless tobacco: Never   Vaping Use    Vaping status: Never Used           10/8/2024   LAST FHS-7 RESULTS   1st degree relative breast or ovarian cancer Yes   Any relative bilateral breast cancer No   Any male have breast cancer Yes   Any ONE woman have BOTH breast AND ovarian cancer No   Any woman with breast cancer before 50yrs No   2 or more relatives with breast AND/OR ovarian cancer Yes   2 or more relatives with breast AND/OR bowel cancer No        Mammogram Screening - After age 74- determine frequency with patient based on health status, life expectancy and patient goals       Labs reviewed in EPIC  BP Readings from Last 3 Encounters:   12/16/24 138/88   12/02/24 (!) 160/103   09/09/24 (!) 154/98    Wt Readings from Last 3 Encounters:   12/16/24 86.3 kg (190 lb 3.2 oz)   12/02/24 86 kg (189 lb 9.6 oz)   09/09/24 87.5 kg (193 lb)                  Patient Active Problem List   Diagnosis    Psoriasis    Hx of melanoma excision    Chronic constipation    History of diverticulitis    Left renal mass    Heart murmur: Benign ?AS    Heart failure with preserved ejection fraction, NYHA class IV (H)    Morbid obesity (H)    Complex renal cyst    Urinary urgency    Sigmoid diverticulitis    Primary osteoarthritis of left hand    PAD (peripheral artery disease) (H)    Osteoarthrosis    MIRELLA (obstructive sleep apnea)    Memory impairment    Left lumbar radiculopathy     Intermittent small bowel obstruction due to adhesions (H)    Hyperlipidemia    Hearing loss    Gastroesophageal reflux disease without esophagitis    Diverticulosis of colon    Displacement of lumbar intervertebral disc without myelopathy    COVID-19    CAD (coronary artery disease)    Chronic obstructive pulmonary disease (H)    Chronic CHF (congestive heart failure) (H)    Anemia    Age-related macular degeneration    Acute lower gastrointestinal bleeding    Acute left-sided low back pain without sciatica    Anginal equivalent (H)    Aortic valve stenosis, etiology of cardiac valve disease unspecified     No past surgical history on file.    Social History     Tobacco Use    Smoking status: Never     Passive exposure: Never    Smokeless tobacco: Never   Substance Use Topics    Alcohol use: Not on file     Family History   Problem Relation Age of Onset    Breast Cancer Mother     Breast Cancer Brother          Current Outpatient Medications   Medication Sig Dispense Refill    aspirin 81 MG EC tablet Take 81 mg by mouth daily as needed.      fluocinonide (LIDEX) 0.05 % external solution Apply topically daily. 20 mL 0    gabapentin (NEURONTIN) 300 MG capsule Take 1 capsule (300 mg) by mouth 2 times daily as needed for neuropathic pain. 60 capsule 5    levothyroxine (SYNTHROID/LEVOTHROID) 50 MCG tablet Take 1 tablet (50 mcg) by mouth daily. 90 tablet 1    Lutein 20 MG CAPS Take 2 capsules by mouth.      Multiple Vitamins-Minerals (PRESERVISION/LUTEIN) CAPS Take 1 capsule by mouth      nitroGLYcerin (NITROSTAT) 0.4 MG sublingual tablet DISSOLVE 1 TABLET UNDER TONGUE every 5 minutes as needed for chest pain. if pain persists after 3 doses, seek prompt medical attention. 25 tablet 0    polyethylene glycol-propylene glycol (SYSTANE ULTRA) 0.4-0.3 % SOLN ophthalmic solution Apply 1 drop to eye      PRED FORTE 1 % OP SUSP Apply to eye.      VIGAMOX 0.5 % OP SOLN Apply to eye.      ZYMAR 0.3 % OP SOLN Apply to eye.       diclofenac (VOLTAREN) 75 MG EC tablet Take 1 tablet (75 mg) by mouth 2 times daily for 7 days 14 tablet 0     Allergies   Allergen Reactions    Latex      Got a blister on leg, maybe from catheter?    Latex Other (See Comments)     Other reaction(s): Blisters    Tramadol Other (See Comments)     Other reaction(s): Abdominal pain       Do you have a current opioid prescription? no  Do you use any other controlled substances or medications that are not prescribed by a provider? no   Current providers sharing in care for this patient include:  Patient Care Team:  Cristhian Vera MD as PCP - General (Internal Medicine)  Sanjay Anderson MD as MD (Otolaryngology)  Cristhian Vera MD as Assigned PCP    The following health maintenance items are reviewed in Epic and correct as of today:  Health Maintenance   Topic Date Due    HF ACTION PLAN  Never done    SPIROMETRY  Never done    COPD ACTION PLAN  Never done    ZOSTER IMMUNIZATION (1 of 2) Never done    RSV VACCINE (1 - 1-dose 75+ series) Never done    MEDICARE ANNUAL WELLNESS VISIT  12/07/2023    LIPID  12/07/2023    INFLUENZA VACCINE (1) 09/01/2024    COVID-19 Vaccine (1 - 2024-25 season) Never done    BMP  03/09/2025    ALT  09/09/2025    CBC  09/09/2025    ANNUAL REVIEW OF HM ORDERS  12/02/2025    FALL RISK ASSESSMENT  12/16/2025    DTAP/TDAP/TD IMMUNIZATION (4 - Td or Tdap) 03/30/2027    ADVANCE CARE PLANNING  12/08/2027    DEXA  08/18/2036    TSH W/FREE T4 REFLEX  Completed    PHQ-2 (once per calendar year)  Completed    Pneumococcal Vaccine: 65+ Years  Completed    HPV IMMUNIZATION  Aged Out    MENINGITIS IMMUNIZATION  Aged Out    RSV MONOCLONAL ANTIBODY  Aged Out         12/16/2024   Mini Cog   Clock Draw Score 2 Normal   3 Item Recall 3 objects recalled   Mini Cog Total Score 5

## 2024-12-23 ENCOUNTER — NURSE TRIAGE (OUTPATIENT)
Dept: FAMILY MEDICINE | Facility: CLINIC | Age: 79
End: 2024-12-23
Payer: MEDICARE

## 2024-12-23 NOTE — TELEPHONE ENCOUNTER
"Patient calling c/o \"terrible cough\". She will like Dr. Vera to prescribe something, so that she can take her PFTs on Thursday with no issue.     She declined triage. RN advise that she will need some form of visit and preferable in office as provider would want to list to lung sounds, vitals, and potential imaging.   RN advise UC as we do not have any openings with PCP.     Pt verbalized understanding and agrees with plan. States she will speak with her  as her  handles her schedule. Pt reports she will go to an urgent care close to her home.         Landy Osborne RN    [facility]        "

## 2024-12-25 NOTE — PROGRESS NOTES
SUBJECTIVE:   Emerald is a 79 year old, presenting for the following:  Wellness Visit    Health Care Directive  Patient does not have a Health Care Directive: Discussed advance care planning with patient; however, patient declined at this time.      12/16/2024   General Health   How would you rate your overall physical health? Good   Feel stress (tense, anxious, or unable to sleep) Not at all         12/16/2024   Mini Cog   Clock Draw Score 2 Normal   3 Item Recall 3 objects recalled   Mini Cog Total Score 5         12/16/2024   Nutrition   Diet: Regular (no restrictions)         12/16/2024   Exercise   Days per week of moderate/strenous exercise 2 days   Average minutes spent exercising at this level 20 min         12/16/2024   Social Factors   Frequency of gathering with friends or relatives Once a week   Worry food won't last until get money to buy more No   Food not last or not have enough money for food? No   Do you have housing? (Housing is defined as stable permanent housing and does not include staying ouside in a car, in a tent, in an abandoned building, in an overnight shelter, or couch-surfing.) Yes   Are you worried about losing your housing? No   Lack of transportation? No   Unable to get utilities (heat,electricity)? No         12/16/2024   Activities of Daily Living- Home Safety   Needs help with the following daily activites None of the above   Safety concerns in the home None of the above         12/16/2024   Dental   Dentist two times every year? Yes         12/16/2024   Hearing Screening   Hearing concerns? None of the above           12/16/2024   Urinary Incontinence Screening   Bothered by leaking urine in past 6 months Yes         12/16/2024   TB Screening   Were you born outside of the [place]? No         Today's PHQ-2 Score:       12/16/2024     1:40 PM   PHQ-2 ( 1999 Pfizer)   PHQ-2 Score Incomplete           12/16/2024   Substance Use   Alcohol more than 3/day or more than 7/wk Not Applicable    Do you have a current opioid prescription? No   How severe/bad is pain from 1 to 10? 5/10   Do you use any other substances recreationally? No     Social History     Tobacco Use    Smoking status: Never     Passive exposure: Never    Smokeless tobacco: Never   Vaping Use    Vaping status: Never Used           10/8/2024   LAST FHS-7 RESULTS   1st degree relative breast or ovarian cancer Yes   Any relative bilateral breast cancer No   Any male have breast cancer Yes   Any ONE woman have BOTH breast AND ovarian cancer No   Any woman with breast cancer before 50yrs No   2 or more relatives with breast AND/OR ovarian cancer Yes   2 or more relatives with breast AND/OR bowel cancer No     Mammogram Screening - After age 74- determine frequency with patient based on health status, life expectancy and patient goals    Lab work is in process  Labs reviewed in EPIC  BP Readings from Last 3 Encounters:   12/16/24 138/88   12/02/24 (!) 160/103   09/09/24 (!) 154/98    Wt Readings from Last 3 Encounters:   12/16/24 86.3 kg (190 lb 3.2 oz)   12/02/24 86 kg (189 lb 9.6 oz)   09/09/24 87.5 kg (193 lb)                  Patient Active Problem List   Diagnosis    Psoriasis    Hx of melanoma excision    Chronic constipation    History of diverticulitis    Left renal mass    Heart murmur: Benign ?AS    Heart failure with preserved ejection fraction, NYHA class IV (H)    Morbid obesity (H)    Complex renal cyst    Urinary urgency    Sigmoid diverticulitis    Primary osteoarthritis of left hand    PAD (peripheral artery disease) (H)    Osteoarthrosis    MIRELLA (obstructive sleep apnea)    Memory impairment    Left lumbar radiculopathy    Intermittent small bowel obstruction due to adhesions (H)    Hyperlipidemia    Hearing loss    Gastroesophageal reflux disease without esophagitis    Diverticulosis of colon    Displacement of lumbar intervertebral disc without myelopathy    COVID-19    CAD (coronary artery disease)    Chronic obstructive  pulmonary disease (H)    Chronic CHF (congestive heart failure) (H)    Anemia    Age-related macular degeneration    Acute lower gastrointestinal bleeding    Acute left-sided low back pain without sciatica    Anginal equivalent (H)    Aortic valve stenosis, etiology of cardiac valve disease unspecified     No past surgical history on file.    Social History     Tobacco Use    Smoking status: Never     Passive exposure: Never    Smokeless tobacco: Never   Substance Use Topics    Alcohol use: Not on file     Family History   Problem Relation Age of Onset    Breast Cancer Mother     Breast Cancer Brother          Allergies   Allergen Reactions    Latex      Got a blister on leg, maybe from catheter?    Latex Other (See Comments)     Other reaction(s): Blisters    Tramadol Other (See Comments)     Other reaction(s): Abdominal pain         Do you have a current opioid prescription? no  Do you use any other controlled substances or medications that are not prescribed by a provider? no   Current providers sharing in care for this patient include:  Patient Care Team:  Cristhian Vera MD as PCP - General (Internal Medicine)  Sanjay Anderson MD as MD (Otolaryngology)  Cristhian Vera MD as Assigned PCP    The following health maintenance items are reviewed in Epic and correct as of today:  Health Maintenance   Topic Date Due    HF ACTION PLAN  Never done    SPIROMETRY  Never done    COPD ACTION PLAN  Never done    ZOSTER IMMUNIZATION (1 of 2) Never done    RSV VACCINE (1 - 1-dose 75+ series) Never done    LIPID  12/07/2023    INFLUENZA VACCINE (1) 09/01/2024    COVID-19 Vaccine (1 - 2024-25 season) Never done    BMP  03/09/2025    ALT  09/09/2025    CBC  09/09/2025    ANNUAL REVIEW OF HM ORDERS  12/02/2025    MEDICARE ANNUAL WELLNESS VISIT  12/16/2025    FALL RISK ASSESSMENT  12/16/2025    DTAP/TDAP/TD IMMUNIZATION (4 - Td or Tdap) 03/30/2027    ADVANCE CARE PLANNING  12/08/2027    DEXA  08/18/2036    TSH  "W/FREE T4 REFLEX  Completed    PHQ-2 (once per calendar year)  Completed    Pneumococcal Vaccine: 50+ Years  Completed    HPV IMMUNIZATION  Aged Out    MENINGITIS IMMUNIZATION  Aged Out    RSV MONOCLONAL ANTIBODY  Aged Out        Reviewed and updated as needed this visit by clinical staff   Tobacco  Allergies  Meds              Reviewed and updated as needed this visit by Provider                  Review of Systems  CONSTITUTIONAL:POSITIVE  for morbid obesity.  INTEGUMENTARY/SKIN: NEGATIVE for worrisome rashes, moles or lesions  EYES: NEGATIVE for vision changes or irritation  ENT/MOUTH: NEGATIVE for ear, mouth and throat problems  RESP:POSITIVE for COPD.  BREAST: NEGATIVE for masses, tenderness or discharge  CV: POSITIVE for coronary artery disease and aortic valve stenosis.  GI: NEGATIVE for nausea, abdominal pain, heartburn, or change in bowel habits  : NEGATIVE for frequency, dysuria, or hematuria  MUSCULOSKELETAL: NEGATIVE for significant arthralgias or myalgia  NEURO: NEGATIVE for weakness, dizziness or paresthesias  ENDOCRINE: NEGATIVE for temperature intolerance, skin/hair changes  HEME: NEGATIVE for bleeding problems  PSYCHIATRIC: NEGATIVE for changes in mood or affect    OBJECTIVE:   /88 (BP Location: Left arm, Patient Position: Sitting, Cuff Size: Adult Large)   Pulse 91   Temp 97.2  F (36.2  C) (Temporal)   Resp 20   Ht 1.6 m (5' 3\")   Wt 86.3 kg (190 lb 3.2 oz)   SpO2 96%   BMI 33.69 kg/m     Estimated body mass index is 33.69 kg/m  as calculated from the following:    Height as of this encounter: 1.6 m (5' 3\").    Weight as of this encounter: 86.3 kg (190 lb 3.2 oz).  Physical Exam  GENERAL: alert and no distress  EYES: Eyes grossly normal to inspection and conjunctivae and sclerae normal  HENT: normal cephalic/atraumatic and oral mucous membranes moist  NECK: no adenopathy and thyroid normal to palpation  RESP: lungs clear to auscultation - no rales, rhonchi or wheezes  CV: regular " "rates and rhythm and peripheral pulses strong  ABDOMEN: soft, nontender and bowel sounds normal  NEURO: Normal strength and tone, mentation intact and speech normal  PSYCH: mentation appears normal, affect normal/bright    Diagnostic Test Results:  No results found for any visits on 12/16/24.    ASSESSMENT/PLAN:     Encounter for Medicare annual wellness exam  - REVIEW OF HEALTH MAINTENANCE PROTOCOL ORDERS    Coronary artery disease involving native coronary artery of native heart without angina pectoris  - aspirin 81 MG EC tablet; Take 81 mg by mouth daily as needed.    Chronic obstructive pulmonary disease, unspecified COPD type (H)  - General PFT Lab (Please always keep checked); Future  - Pulmonary Function Test; Future    Aortic valve stenosis, etiology of cardiac valve disease unspecified  - Echocardiogram Complete; Future    Morbid obesity (H)  - REVIEW OF HEALTH MAINTENANCE PROTOCOL ORDERS     Patient has been advised of split billing requirements and indicates understanding: Yes      Counseling  Special attention given to diet and exercise.  Appropriate preventive services were addressed with this patient via screening, questionnaire, or discussion as appropriate for fall prevention, nutrition, physical activity, tobacco-use cessation, social engagement, weight loss and cognition. Checklist reviewing preventive services available has been given to the patient.    BMI  Estimated body mass index is 33.69 kg/m  as calculated from the following:    Height as of this encounter: 1.6 m (5' 3\").    Weight as of this encounter: 86.3 kg (190 lb 3.2 oz).         She reports that she has never smoked. She has never been exposed to tobacco smoke. She has never used smokeless tobacco.      Signed Electronically by: Cristhian Vera MD  "

## 2024-12-30 ENCOUNTER — OFFICE VISIT (OUTPATIENT)
Dept: ANESTHESIOLOGY | Facility: CLINIC | Age: 79
End: 2024-12-30
Attending: INTERNAL MEDICINE
Payer: MEDICARE

## 2024-12-30 VITALS
HEART RATE: 91 BPM | RESPIRATION RATE: 16 BRPM | DIASTOLIC BLOOD PRESSURE: 91 MMHG | SYSTOLIC BLOOD PRESSURE: 151 MMHG | OXYGEN SATURATION: 95 %

## 2024-12-30 DIAGNOSIS — S46.812S TRAPEZIUS STRAIN, LEFT, SEQUELA: ICD-10-CM

## 2024-12-30 DIAGNOSIS — M79.18 MYOFASCIAL PAIN: Primary | ICD-10-CM

## 2024-12-30 DIAGNOSIS — S29.012A STRAIN OF LEFT RHOMBOID MUSCLE: ICD-10-CM

## 2024-12-30 DIAGNOSIS — S46.811S STRAIN OF RIGHT TRAPEZIUS MUSCLE, SEQUELA: ICD-10-CM

## 2024-12-30 PROCEDURE — 20553 NJX 1/MLT TRIGGER POINTS 3/>: CPT | Performed by: STUDENT IN AN ORGANIZED HEALTH CARE EDUCATION/TRAINING PROGRAM

## 2024-12-30 PROCEDURE — 99207 PR NO CHARGE INJECTABLE MED/DRUG: CPT | Performed by: STUDENT IN AN ORGANIZED HEALTH CARE EDUCATION/TRAINING PROGRAM

## 2024-12-30 RX ORDER — ACETAMINOPHEN 500 MG
1000 TABLET ORAL
COMMUNITY

## 2024-12-30 RX ORDER — HYDROCHLOROTHIAZIDE 25 MG/1
1 TABLET ORAL DAILY
COMMUNITY
Start: 2024-10-01

## 2024-12-30 RX ORDER — BUPIVACAINE HYDROCHLORIDE 2.5 MG/ML
20 INJECTION, SOLUTION EPIDURAL; INFILTRATION; INTRACAUDAL ONCE
Status: COMPLETED | OUTPATIENT
Start: 2024-12-30 | End: 2024-12-30

## 2024-12-30 RX ORDER — ISOSORBIDE MONONITRATE 30 MG/1
1 TABLET, EXTENDED RELEASE ORAL DAILY
COMMUNITY
Start: 2024-08-19

## 2024-12-30 RX ADMIN — BUPIVACAINE HYDROCHLORIDE 25 MG: 2.5 INJECTION, SOLUTION EPIDURAL; INFILTRATION; INTRACAUDAL at 14:37

## 2024-12-30 ASSESSMENT — PAIN SCALES - GENERAL: PAINLEVEL_OUTOF10: WORST PAIN (10)

## 2024-12-30 NOTE — PROGRESS NOTES
[facility]  Procedure Note: Trigger Point Injections    Patient: Emerald Kulkarni Age: 79 year old   MRN: [id number] Attending:  Dr. Birmingham      Procedure Performed  Trigger point injections    Assistant Physician  None    Pre/Post-Procedure Diagnosis  Myofascial pain syndrome    Anesthesia: Local  Blood Loss: None  Drains and Specimens: None  Complications: None  IV Access: Yes     Informed Consent: The patient's condition, proposed procedure and risks, benefits and alternatives to this procedure were discussed with the patient in detail. All questions were answered in detail and the patient chose to proceed.      Procedure Description  The patient was positioned comfortably on the procedure bed or chair. The painful muscles were palpated to locate active trigger points. The overlying skin was then wiped clean with chlorhexidine. A 25 gauge, 2.5 inch needle was slowly advanced into the trigger point. After negative aspiration, 1mL of 0.25% bupivacaine plain was injected. This process was repeated at each trigger point in the muscles listed below.    Total injectate: 10mL of 0.25% bupivacaine  Muscles injected: bilateral trapezius, bilateral rhomboids, left lumbar paraspinals    Medication Questions/Issues Discussed Today: None    Mello Birmingham MD  Department of Anesthesiology  Chronic and Interventional Pain Medicine

## 2024-12-30 NOTE — PATIENT INSTRUCTIONS
Treatment planning:    Trigger Point Injections completed today-   Call us with any concerns for infection: redness and drainage at the injection site, fever, chills, sweats       Recommended Follow up:      Follow up with referring provider.       To speak with a nurse, schedule/reschedule/cancel a clinic appointment, or request a medication refill call: [phone]    You can also reach us by [employer]: [email or web address]

## 2024-12-30 NOTE — LETTER
12/30/2024       RE: Emerald Kulkarni  [address] [facility] [address]     Dear Colleague,    Thank you for referring your patient, Emerald Kulkarni, to the [facility] [place] [facility]. Please see a copy of my visit note below.    [facility]  Procedure Note: Trigger Point Injections    Patient: Emerald Kulkarni Age: 79 year old   MRN: [id number] Attending:  Dr. Birmingham      Procedure Performed  Trigger point injections    Assistant Physician  None    Pre/Post-Procedure Diagnosis  Myofascial pain syndrome    Anesthesia: Local  Blood Loss: None  Drains and Specimens: None  Complications: None  IV Access: Yes     Informed Consent: The patient's condition, proposed procedure and risks, benefits and alternatives to this procedure were discussed with the patient in detail. All questions were answered in detail and the patient chose to proceed.      Procedure Description  The patient was positioned comfortably on the procedure bed or chair. The painful muscles were palpated to locate active trigger points. The overlying skin was then wiped clean with chlorhexidine. A 25 gauge, 2.5 inch needle was slowly advanced into the trigger point. After negative aspiration, 1mL of 0.25% bupivacaine plain was injected. This process was repeated at each trigger point in the muscles listed below.    Total injectate: 10mL of 0.25% bupivacaine  Muscles injected: bilateral trapezius, bilateral rhomboids, left lumbar paraspinals    Medication Questions/Issues Discussed Today: None    Mello Birmingham MD  Department of Anesthesiology  Chronic and Interventional Pain Medicine      Again, thank you for allowing me to participate in the care of your patient.      Sincerely,    Mello Birmingham MD     no concerns

## 2024-12-30 NOTE — NURSING NOTE
Patient presents with:  Back Pain  RECHECK: TPI      Worst Pain (10)     Pain Medications       Analgesics Other Refills Start End     acetaminophen (TYLENOL) 500 MG tablet --  --    Sig - Route: Take 1,000 mg by mouth. - Oral    Class: Historical       Salicylates Refills Start End     aspirin 81 MG EC tablet --  --    Sig - Route: Take 81 mg by mouth daily as needed. - Oral    Class: Historical            What medications are you using for pain? Tylenol, ASA    Have you been seen by another pain clinic/ provider? no      Expectations: pain relief    /91-prescribed isosorbide, not want to take    Kenisha Sanchez LPN

## 2025-01-21 ENCOUNTER — TELEPHONE (OUTPATIENT)
Dept: FAMILY MEDICINE | Facility: CLINIC | Age: 80
End: 2025-01-21
Payer: MEDICARE

## 2025-01-21 NOTE — TELEPHONE ENCOUNTER
Patient Returning Call    Reason for call:  Has pulmonary appointments at the , had to reschedule due to being sick and also the weather. She just want to make sure PCP know she is following up with them, her next appointment is 01/30/2025.     Information relayed to patient:  Patient is aware it may take 1-2 business days to hear a response.     Patient has additional questions:  No      Okay to leave a detailed message?: Yes at Home number on file [phone] (home)

## 2025-01-30 ENCOUNTER — OFFICE VISIT (OUTPATIENT)
Dept: PULMONOLOGY | Facility: CLINIC | Age: 80
End: 2025-01-30
Payer: MEDICARE

## 2025-01-30 DIAGNOSIS — J44.9 CHRONIC OBSTRUCTIVE PULMONARY DISEASE, UNSPECIFIED COPD TYPE (H): ICD-10-CM

## 2025-01-30 LAB
DLCOUNC-%PRED-PRE: 108 %
DLCOUNC-PRE: 19.21 ML/MIN/MMHG
DLCOUNC-PRED: 17.75 ML/MIN/MMHG
ERV-%PRED-PRE: 10 %
ERV-PRE: 0.08 L
ERV-PRED: 0.8 L
EXPTIME-PRE: 7.54 SEC
FEF2575-%PRED-PRE: 74 %
FEF2575-PRE: 1.04 L/SEC
FEF2575-PRED: 1.4 L/SEC
FEFMAX-%PRED-PRE: 122 %
FEFMAX-PRE: 5.49 L/SEC
FEFMAX-PRED: 4.47 L/SEC
FEV1-%PRED-PRE: 91 %
FEV1-PRE: 1.56 L
FEV1FEV6-PRE: 74 %
FEV1FEV6-PRED: 77 %
FEV1FVC-PRE: 74 %
FEV1FVC-PRED: 78 %
FEV1SVC-PRE: 75 %
FEV1SVC-PRED: 60 %
FIFMAX-PRE: 1.77 L/SEC
FRCPLETH-%PRED-PRE: 87 %
FRCPLETH-PRE: 2.48 L
FRCPLETH-PRED: 2.83 L
FVC-%PRED-PRE: 95 %
FVC-PRE: 2.11 L
FVC-PRED: 2.21 L
IC-%PRED-PRE: 124 %
IC-PRE: 1.98 L
IC-PRED: 1.59 L
RVPLETH-%PRED-PRE: 108 %
RVPLETH-PRE: 2.4 L
RVPLETH-PRED: 2.2 L
TLCPLETH-%PRED-PRE: 93 %
TLCPLETH-PRE: 4.46 L
TLCPLETH-PRED: 4.77 L
VA-%PRED-PRE: 89 %
VA-PRE: 3.85 L
VC-%PRED-PRE: 72 %
VC-PRE: 2.07 L
VC-PRED: 2.83 L

## 2025-01-30 NOTE — PROGRESS NOTES
Emerald Kulkarni comes into clinic today at the request of Dr. Vera Ordering Provider for Full PFT    Roseanne Mcdonough

## 2025-02-11 ENCOUNTER — NURSE TRIAGE (OUTPATIENT)
Dept: FAMILY MEDICINE | Facility: CLINIC | Age: 80
End: 2025-02-11
Payer: MEDICARE

## 2025-02-11 NOTE — TELEPHONE ENCOUNTER
Pt called d/t experiencing a rash last night. Pt states symptoms started suddenly: she was severely itchy throughout her body and noted mild tongue swelling. Pt unable to pin-point cause, however, took a shower to calm skin and went to bed.    Next morning, no tongue swelling. Pt also denies fever, hives, bright-red colored rash, stiff neck, severe headache, joint pain/swelling, blisters of skin, starting a new medication, sore throat, or purple or blood color spots.     Only remnant of rash is mild-moderate itching. Per protocol, home care recommended. Care advise given and educated pt on when to call back or go to ED. Pt verbalized understanding.    Michelle Bourne RN on 2/11/2025 at 4:35 PM     Reason for Disposition   Heat rash, questions about    Additional Information   Negative: Sudden onset of rash (within last 2 hours) and difficulty with breathing or swallowing   Negative: Difficult to awaken or acting confused (e.g., disoriented, slurred speech)   Negative: Fever and purple or blood-colored spots or dots   Negative: Too weak or sick to stand   Negative: Life-threatening reaction (anaphylaxis) in the past to similar substance (e.g., food, insect bite/sting, chemical, etc.) and < 2 hours since exposure   Negative: Sounds like a life-threatening emergency to the triager   Negative: Drug rash suspected and started taking new medicine within last 2 weeks  (Exception: Antihistamine, eye drops, ear drops, decongestant or other OTC cough/cold medicines.)   Negative: Hives suspected   Negative: Insect bites suspected   Negative: MPOX SUSPECTED (e.g., direct skin contact such as sex, recent travel to [place] or [place]) and any SYMPTOMS OF MPOX (e.g., rash, fever, muscle aches, or swollen lymph nodes)   Negative: AT RISK FOR MPOX (men-who-have-sex-with-men) and POSSIBLE EXPOSURE (e.g., multiple sex partners in past 21 days) and ANY SYMPTOMS OF MPOX (e.g., rash, fever, muscle aches, or swollen lymph nodes)    Negative: Sunburn suspected   Negative: Bright red, sunburn-like rash and current tampon use or nasal packing   Negative: Bright red, sunburn-like rash and wound infection or recent surgery   Negative: Bright red skin that peels off in sheets   Negative: Stiff neck (can't touch chin to chest)   Negative: Patient sounds very sick or weak to the triager   Negative: Fever   Negative: Face becomes swollen   Negative: Headache   Negative: Purple or blood-colored spots or dots (no fever and sounds well to triager)   Negative: Joint pain or swelling   Negative: Bloody crusts on lips or sores in mouth   Negative: Large or small blisters on skin (i.e., fluid filled bubbles or sacs)   Negative: Pregnant   Negative: Rash began within 4 hours of a new prescription medication   Negative: SEVERE itching   Negative: Sore throat   Negative: Ring-like appearance of rash (or ask: does it look like a 'target' or 'bulls-eye')   Negative: Patient wants to be seen   Negative: Mild widespread rash  (Exception: Heat rash lasting 3 days or less.)    Protocols used: Rash or Redness - Widespread-A-OH

## 2025-02-12 NOTE — TELEPHONE ENCOUNTER
Checked in on patient and symptoms have now resolved.     Michelle Bourne RN on 2/12/2025 at 1:30 PM

## 2025-02-19 DIAGNOSIS — L40.9 SCALP PSORIASIS: ICD-10-CM

## 2025-02-19 RX ORDER — FLUOCINONIDE TOPICAL SOLUTION USP, 0.05% 0.5 MG/ML
SOLUTION TOPICAL DAILY
Qty: 20 ML | Refills: 2 | Status: SHIPPED | OUTPATIENT
Start: 2025-02-19

## 2025-03-24 ENCOUNTER — ANCILLARY PROCEDURE (OUTPATIENT)
Dept: GENERAL RADIOLOGY | Facility: CLINIC | Age: 80
End: 2025-03-24
Attending: INTERNAL MEDICINE
Payer: MEDICARE

## 2025-03-24 ENCOUNTER — OFFICE VISIT (OUTPATIENT)
Dept: FAMILY MEDICINE | Facility: CLINIC | Age: 80
End: 2025-03-24
Payer: MEDICARE

## 2025-03-24 VITALS
BODY MASS INDEX: 33.98 KG/M2 | RESPIRATION RATE: 18 BRPM | SYSTOLIC BLOOD PRESSURE: 170 MMHG | WEIGHT: 191.8 LBS | DIASTOLIC BLOOD PRESSURE: 104 MMHG | TEMPERATURE: 97.7 F | HEART RATE: 74 BPM | OXYGEN SATURATION: 92 % | HEIGHT: 63 IN

## 2025-03-24 DIAGNOSIS — E66.01 MORBID OBESITY (H): ICD-10-CM

## 2025-03-24 DIAGNOSIS — E78.5 HYPERLIPIDEMIA LDL GOAL <70: ICD-10-CM

## 2025-03-24 DIAGNOSIS — N28.1 RENAL CYST: ICD-10-CM

## 2025-03-24 DIAGNOSIS — K21.00 GASTROESOPHAGEAL REFLUX DISEASE WITH ESOPHAGITIS WITHOUT HEMORRHAGE: ICD-10-CM

## 2025-03-24 DIAGNOSIS — J44.1 COPD EXACERBATION (H): Primary | ICD-10-CM

## 2025-03-24 DIAGNOSIS — I25.10 CORONARY ARTERY DISEASE INVOLVING NATIVE CORONARY ARTERY OF NATIVE HEART WITHOUT ANGINA PECTORIS: ICD-10-CM

## 2025-03-24 PROBLEM — I50.30 HEART FAILURE WITH PRESERVED EJECTION FRACTION, NYHA CLASS IV (H): Status: RESOLVED | Noted: 2021-08-08 | Resolved: 2025-03-24

## 2025-03-24 PROCEDURE — 71046 X-RAY EXAM CHEST 2 VIEWS: CPT | Mod: TC | Performed by: RADIOLOGY

## 2025-03-24 RX ORDER — DOXYCYCLINE 100 MG/1
100 CAPSULE ORAL 2 TIMES DAILY
Qty: 14 CAPSULE | Refills: 0 | Status: SHIPPED | OUTPATIENT
Start: 2025-03-24

## 2025-03-24 RX ORDER — DOXYCYCLINE 100 MG/1
100 CAPSULE ORAL 2 TIMES DAILY
COMMUNITY
End: 2025-03-24

## 2025-03-24 RX ORDER — OMEPRAZOLE 20 MG/1
20 CAPSULE, DELAYED RELEASE ORAL
Qty: 30 CAPSULE | Refills: 11 | Status: SHIPPED | OUTPATIENT
Start: 2025-03-24

## 2025-03-24 ASSESSMENT — PATIENT HEALTH QUESTIONNAIRE - PHQ9
10. IF YOU CHECKED OFF ANY PROBLEMS, HOW DIFFICULT HAVE THESE PROBLEMS MADE IT FOR YOU TO DO YOUR WORK, TAKE CARE OF THINGS AT HOME, OR GET ALONG WITH OTHER PEOPLE: SOMEWHAT DIFFICULT
SUM OF ALL RESPONSES TO PHQ QUESTIONS 1-9: 6
SUM OF ALL RESPONSES TO PHQ QUESTIONS 1-9: 6

## 2025-03-24 ASSESSMENT — PAIN SCALES - GENERAL: PAINLEVEL_OUTOF10: MILD PAIN (2)

## 2025-03-24 NOTE — PATIENT INSTRUCTIONS
At [facility], we strive to deliver an exceptional experience to you, every time we see you. If you receive a survey, please let us know what we are doing well and/or what we could improve upon, as we do value your feedback.  If you have MyChart, you can expect to receive results automatically within 24 hours of their completion.  Your provider will send a note interpreting your results as well.   If you do not have MyChart, you should receive your results in about a week by mail.    Your care team:                            Family Medicine Internal Medicine   MD Cristhian Prasad, MD Rossi Luis, MD Jovanny Garibay, MD Jasmyne Do, PATiffanieC    Anton Gandhi, MD Pediatrics   Sherry Stokes, MD Mary Lou Hanson, MD Landy Queen, APRN CNP Gabrielle Escobar APRN CNP   MD Albina Bear, MD Ema Gaming, CNP     João Samaniego, CNP Same-Day Provider (No follow-up visits)   CASS Trujillo, DNP Ember Sabillon, CASS Acosta, FNP, BC JESSICA BartonC     Clinic hours: Monday - Thursday 7 am-6 pm; Fridays 7 am-5 pm.   Urgent care: Monday - Friday 10 am- 8 pm; Saturday and Sunday 9 am-5 pm.    Clinic: [phone]       [facility]: Monday - Thursday 8 am - 7 pm; Friday 8 am - 6 pm  [facility]: [phone]

## 2025-03-24 NOTE — PROGRESS NOTES
History of Present Illness       Reason for visit:  Follow up    She eats 2-3 servings of fruits and vegetables daily.She consumes 1 sweetened beverage(s) daily.She exercises with enough effort to increase her heart rate 9 or less minutes per day.  She exercises with enough effort to increase her heart rate 3 or less days per week.   She is taking medications regularly.    PET MYOCARDIAL PERFUSION IMAGING REPORT   REST/STRESS PHARMACOLOGIC GATED PET IMAGING WITH CT ATTENUATION CORRECTION     EXAM: PET CT CARDIAC PERFUSION MULTPLE REST AND STRESS DUAL READ [[id number]]   INDICATION: Chest pain, unspecified type   Coronary artery disease, unspecified vessel or lesion type, unspecified   whether angina present, unspecified whether native or transplanted heart,   and Chest pain/anginal equiv, prior revascularization   HTI appropriateness score: 8 - Indicated     PATIENT DEMOGRAPHICS   Patient Name: Emerald Kulakrni   : 1945   MRN: [id number]   Height: 63 inches   Weight: 198 lbs   BMI: 35.1 kg/m2   Study Date: 10/21/2024  2:05 PM   Ordering Provider: Andrade Perez   Previous MPI:  3/28/2014   Previous Ca Score: none   Technologist: BHAVESH     FINAL CONCLUSIONS   PERFUSION:   NORMAL myocardial perfusion.     FUNCTION:   Overall left ventricular systolic function was normal without wall motion   abnormalities.   Rest ejection fraction = 71%   Left Ventricular Ejection Fraction remained unchanged with stress.     FLOW:   Resting myocardial blood flow is normal post correction for rate-pressure   product.   Stress myocardial blood flow is normal with normal myocardial blood flow   reserve.   Global LV score of 3.3 indicating lower risk of major adverse   cardiovascular events.     CORONARY CALCIUM:   Semi-quantitative calcium score is not performed due to prior stenting and   patient is over 75 years of age.     EKG:   The pharmacologic stress was negative for ischemia      Compared to the prior study from  3/28/2014, the current study is   unchanged.     Please see radiology report for non-cardiac findings.     STRESS TEST SUMMARY     79 y.o. female outpatient with known coronary artery disease.     Cardiac risk factors include: hypertension, hypercholesterolemia, and   obesity   Cardiac history include: coronary artery disease, previous MPI 2014,   previous PTCA/Stent 2012, and previous stress MRI 2021   Presenting Symptoms:  chest pain    Cardiac Medications:  ASA, Lipitor, HCTZ, Imdur, Toprol XL, Nitro   Medications taken within the last 24 hours: patient DID NOT take a   prescribed beta blocker today and patient DID NOT take a prescribed   nitrate today   CAFFEINE/METHYLXANTHINE TAKEN WITHIN 24 HOURS: no       CT ABDOMEN PELVIS W CONTRAST  Order: [id number]  Impression    1.  No inflammatory changes, obstruction, or other acute findings.  2.  Cholelithiasis. Mild hepatic steatosis.  3.  Severe sigmoid diverticulosis without active diverticulitis.  4.  2.5 cm complex cyst left kidney, present for many years with interval enlargement favoring probable low-grade/indolent lesion, possibly Bosniak III versus IIF. Recommend renal MRI for further characterization to help guide management.    MR Abdomen 1-3 Months  Narrative    For Patients: As a result of the 21st Century Cures Act, medical imaging exams and procedure reports are released immediately into your electronic medical record. You may view this report before your referring provider. If you have questions, please contact your health care provider.    EXAM: CT ABDOMEN PELVIS W  LOCATION: [facility]  DATE: 1/23/2024    INDICATION: Right flank pain.  COMPARISON: 04/12/2018.  TECHNIQUE: CT scan of the abdomen and pelvis was performed following injection of IV contrast. Multiplanar reformats were obtained. Dose reduction techniques were used.  CONTRAST: Omnipaque 350 mL.    FINDINGS:  LOWER CHEST: Lung bases clear.    HEPATOBILIARY: Mild steatosis with couple  small benign cysts require no follow-up. Cholelithiasis. Localized wall thickening and cystic changes gallbladder fundus appears chronic suggesting focal probable incidental adenomyomatosis. No other generalized wall thickening. No bile duct dilatation.    PANCREAS: Normal.    SPLEEN: Normal.    ADRENAL GLANDS: No significant nodules.    KIDNEYS/BLADDER: Normal size kidneys. No hydronephrosis or significant calculi. Few small cortical cysts require no follow-up. 2.5 cm complex cyst left kidney lower pole with mildly thickened internal septations and suggestion mild wall thickening has increased previously 1.6 cm in 2018. Bladder decompressed without wall thickening.    BOWEL: No obstruction or inflammatory change. Severe sigmoid diverticulosis without active diverticulitis. Moderate diffuse formed colonic stool. No ascites, free air, or bowel wall pneumatosis.    LYMPH NODES: No lymphadenopathy.    VASCULATURE: Normal caliber with moderate calcified atherosclerosis.    PELVIC ORGANS: No pelvic masses.    MUSCULOSKELETAL: No acute findings. Abdominal wall intact. Lumbar scoliosis and severe multilevel degenerative changes. Bilateral total hip arthroplasties.  Images on Order [id number]

## 2025-03-24 NOTE — PROGRESS NOTES
Mailing AVS to patient's address listed in the chart. This will go out in tomorrow's mail.  [facility]  2nd Floor  Primary Care

## 2025-04-16 DIAGNOSIS — E03.9 ACQUIRED HYPOTHYROIDISM: ICD-10-CM

## 2025-04-16 RX ORDER — LEVOTHYROXINE SODIUM 50 UG/1
50 TABLET ORAL DAILY
Qty: 90 TABLET | Refills: 1 | Status: SHIPPED | OUTPATIENT
Start: 2025-04-16

## 2025-05-04 ENCOUNTER — HOSPITAL ENCOUNTER (EMERGENCY)
Facility: CLINIC | Age: 80
Discharge: HOME | End: 2025-05-04
Attending: EMERGENCY MEDICINE | Admitting: EMERGENCY MEDICINE
Payer: MEDICARE

## 2025-05-04 ENCOUNTER — APPOINTMENT (OUTPATIENT)
Dept: GENERAL RADIOLOGY | Facility: CLINIC | Age: 80
End: 2025-05-04
Attending: EMERGENCY MEDICINE
Payer: MEDICARE

## 2025-05-04 VITALS
RESPIRATION RATE: 18 BRPM | OXYGEN SATURATION: 98 % | BODY MASS INDEX: 33.66 KG/M2 | SYSTOLIC BLOOD PRESSURE: 166 MMHG | HEART RATE: 92 BPM | WEIGHT: 190 LBS | DIASTOLIC BLOOD PRESSURE: 91 MMHG | HEIGHT: 63 IN | TEMPERATURE: 97.5 F
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
## 2025-05-04 DIAGNOSIS — S73.005A DISLOCATION OF LEFT HIP, INITIAL ENCOUNTER (H): ICD-10-CM
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
## 2025-05-04 DIAGNOSIS — Z96.642 HISTORY OF LEFT HIP REPLACEMENT: ICD-10-CM

## 2025-05-04 DIAGNOSIS — S40.011A CONTUSION OF RIGHT SHOULDER, INITIAL ENCOUNTER: ICD-10-CM

## 2025-05-04 PROCEDURE — 99283 EMERGENCY DEPT VISIT LOW MDM: CPT

## 2025-05-04 PROCEDURE — 73030 X-RAY EXAM OF SHOULDER: CPT | Mod: RT

## 2025-05-04 PROCEDURE — 250N000013 HC RX MED GY IP 250 OP 250 PS 637: Performed by: EMERGENCY MEDICINE

## 2025-05-04 PROCEDURE — 73522 X-RAY EXAM HIPS BI 3-4 VIEWS: CPT

## 2025-05-04 RX ADMIN — ACETAMINOPHINE, CAFFEINE 2 TABLET: 500; 65 TABLET, FILM COATED ORAL at 21:13

## 2025-05-04 ASSESSMENT — ACTIVITIES OF DAILY LIVING (ADL)
ADLS_ACUITY_SCORE: 41
ADLS_ACUITY_SCORE: 41

## 2025-05-04 ASSESSMENT — COLUMBIA-SUICIDE SEVERITY RATING SCALE - C-SSRS
2. HAVE YOU ACTUALLY HAD ANY THOUGHTS OF KILLING YOURSELF IN THE PAST MONTH?: NO
6. HAVE YOU EVER DONE ANYTHING, STARTED TO DO ANYTHING, OR PREPARED TO DO ANYTHING TO END YOUR LIFE?: NO
1. IN THE PAST MONTH, HAVE YOU WISHED YOU WERE DEAD OR WISHED YOU COULD GO TO SLEEP AND NOT WAKE UP?: NO

## 2025-05-05 NOTE — ED PROVIDER NOTES
Emergency Department Note      History of Present Illness     Chief Complaint:  L hip pain    HPI   Emerald Kulkarni is a 80 year old female with a remote history of left hip replacement by [facility] orthopedics and also right shoulder rotator cuff injury who presents emergency department with her  for evaluation primarily of a left hip injury.  She was in the basement of her house today, wearing her 's shoes, trying to turn on a light when she accidentally misplaced her foot on some pain hands and fell to the ground, she states that she thinks she temporarily dislocated her left hip but that it popped back in prior to arrival.  She also has lesser discomfort to her right shoulder.  No chest pain or trouble breathing.  No loss of consciousness.  She is on aspirin but is not anticoagulated.  She is confident she will be able to walk.  She has not had any pain medication yet but she would specifically like some Excedrin tension headache formulation.  She was feeling fine earlier in the day.      Independent Historian:  at bedside who contributes to the history incorporated above.    Review of External Notes: I personally reviewed prior records including clinic note from March 24 referencing COPD exacerbation for which she was prescribed doxycycline    Past Medical History     Medical History and Problem List   No past medical history on file.    Medications   acetaminophen (TYLENOL) 500 MG tablet  aspirin 81 MG EC tablet  diclofenac (VOLTAREN) 75 MG EC tablet  doxycycline hyclate (VIBRAMYCIN) 100 MG capsule  fluocinonide (LIDEX) 0.05 % external solution  gabapentin (NEURONTIN) 300 MG capsule  hydrochlorothiazide (HYDRODIURIL) 25 MG tablet  isosorbide mononitrate (IMDUR) 30 MG 24 hr tablet  levothyroxine (SYNTHROID/LEVOTHROID) 50 MCG tablet  Lutein 20 MG CAPS  Multiple Vitamins-Minerals (PRESERVISION/LUTEIN) CAPS  nitroGLYcerin (NITROSTAT) 0.4 MG sublingual tablet  omeprazole (PRILOSEC) 20 MG   "capsule  polyethylene glycol-propylene glycol (SYSTANE ULTRA) 0.4-0.3 % SOLN ophthalmic solution  PRED FORTE 1 % OP SUSP  VIGAMOX 0.5 % OP SOLN  vitamin B complex with vitamin C (VITAMIN  B COMPLEX) tablet  ZYMAR 0.3 % OP SOLN      Surgical History   No past surgical history on file.  Physical Exam     Patient Vitals for the past 24 hrs:   BP Temp Temp src Pulse Resp SpO2 Height Weight   05/04/25 1927 (!) 166/91 97.5  F (36.4  C) Temporal 92 18 98 % 1.6 m (5' 3\") 86.2 kg (190 lb)     Physical Exam  General: Nontoxic-appearing woman recumbent in the gurney in intake room 3,  seated nearby  HENT: MMM, no signs of facial trauma  CV:  regular rhythm, soft compartments in all extremities, cap refill normal in L leg and R arm  Resp: normal effort, speaks in full phrases, no stridor, no cough observed  GI: abdomen soft, nontender  MSK:  Spine: nontender, FROM neck  Extremities: Mild tenderness to the left hip and thigh with good range of motion to left hip knee and ankle  Minimal tenderness to right shoulder diffusely but no point tenderness, able to range her right shoulder well, right clavicle and scapula are nontender.  Right chest wall nontender.  Skin:   No abrasion  No ecchymosis  No laceration  Neuro: awake, alert, responds appropriately to commands, SILT all extremities  Psych: cooperative, pleasant    Diagnostics   Imaging   XR Shoulder Right G/E 3 Views   Final Result   IMPRESSION: Severe glenohumeral joint arthritic change. High-riding humeral head as well as anterior humeral head translation can be seen in the setting of rotator cuff tear or insufficiency.      Mild to moderate AC joint arthrosis. Bones are demineralized. There is no evidence of an acute displaced fracture or dislocation.      XR Pelvis and Hip Bilateral 2 Views   Final Result   IMPRESSION: Bilateral total hip replacements. Bones are demineralized. There is no evidence of an acute displaced fracture on either side. No hip dislocation.  "      Advanced arthrosis lower lumbar spine.         Independent Interpretation:   I personally reviewed hip xray images, I see no major fx or dislocation.    ED Course      Medications Administered   Medications   acetaminophen-caffeine (EXCEDRIN TENSION HEADACHE) 500-65 MG tablet 1-2 tablet (2 tablets Oral $Given 5/4/25 2113)     ED Course and Discussion of Management   ED Course as of 05/05/25 0311   Sun May 04, 2025   2124 I rechecked patient, discussed xray results.       Additional Documentation  None    MIPS       None    Medical Decision Making / Diagnosis   Medical Decision Making:  I explained to the patient that I cannot confirm that she experienced a transient left hip dislocation, though it is certainly possible.  She has had prior hip replacement and there is no evidence of periprosthetic fracture.  She is neurovascularly intact.  No evidence of serious hematoma or other major injury, noting some slight worsening of baseline right shoulder discomfort but no evidence of fracture on x-ray.  She may have soft tissue injury there as well but no indication for hospitalization or emergent surgical consultation.  Patient is ambulatory here in the emergency department and I recommended a knee immobilizer after discussion of the rationale to reduce the risk of recurrent hip dislocation.  Close follow-up through her orthopedic team is advised.  Patient and  comfortable with this plan and she was discharged home in improved condition.    Disposition   Discharged    Diagnosis     ICD-10-CM    1. Dislocation of left hip, initial encounter (H)  S73.005A Ankle/Knee Bracing Supplies Order Knee Immobilizer; Left    suspected; resolved prior to arrival      2. History of left hip replacement  Z96.642 Ankle/Knee Bracing Supplies Order Knee Immobilizer; Left      3. Contusion of right shoulder, initial encounter  S40.011A       4. Fall at home, initial encounter  W19.XXXA     Y92.009          5/4/2025   MG Maldonado  MD Maldonado, Reed Mackey MD  05/05/25 1181

## 2025-05-05 NOTE — ED TRIAGE NOTES
Patient arrived via ems. She is here with left hip pain after a fall. Patient stated she felt like her hip pop out and then pop back in. She has a hx of bilateral hip replacement     Triage Assessment (Adult)       Row Name 05/04/25 1921          Triage Assessment    Airway WDL WDL        Respiratory WDL    Respiratory WDL WDL        Skin Circulation/Temperature WDL    Skin Circulation/Temperature WDL WDL        Cardiac WDL    Cardiac WDL WDL        Peripheral/Neurovascular WDL    Peripheral Neurovascular WDL WDL        Cognitive/Neuro/Behavioral WDL    Cognitive/Neuro/Behavioral WDL WDL

## 2025-05-07 ENCOUNTER — TELEPHONE (OUTPATIENT)
Dept: FAMILY MEDICINE | Facility: CLINIC | Age: 80
End: 2025-05-07
Payer: MEDICARE

## 2025-05-07 NOTE — TELEPHONE ENCOUNTER
Reason for Call:  Appointment Request    Patient requesting this type of appt:  Hospital/ED Follow-Up     Requested provider: Cristhian Vera    Reason patient unable to be scheduled: Not within requested timeframe    When does patient want to be seen/preferred time: 3-7 days    Comments: pt needs a ed follow up apt from a hospital visit, No available apt till June 24th, offered an apt with a another provider and pt did not want to do that, insists on seeing Dr. Vera , Pt refuses to see the kidney dr till she sees Dr. Vera     Okay to leave a detailed message?: Yes at Cell number on file:    Telephone Information:   Mobile [phone]       Call taken on 5/7/2025 at 6:55 AM by Riana Bustillos

## 2025-05-12 ENCOUNTER — OFFICE VISIT (OUTPATIENT)
Dept: FAMILY MEDICINE | Facility: CLINIC | Age: 80
End: 2025-05-12
Payer: MEDICARE

## 2025-05-12 VITALS
BODY MASS INDEX: 32.96 KG/M2 | HEIGHT: 63 IN | RESPIRATION RATE: 20 BRPM | DIASTOLIC BLOOD PRESSURE: 92 MMHG | HEART RATE: 100 BPM | OXYGEN SATURATION: 93 % | TEMPERATURE: 99 F | SYSTOLIC BLOOD PRESSURE: 142 MMHG | WEIGHT: 186 LBS

## 2025-05-12 DIAGNOSIS — I10 HYPERTENSION GOAL BP (BLOOD PRESSURE) < 150/90: ICD-10-CM

## 2025-05-12 DIAGNOSIS — S73.035D ANTERIOR DISLOCATION OF LEFT HIP, SUBSEQUENT ENCOUNTER: Primary | ICD-10-CM

## 2025-05-12 DIAGNOSIS — J30.2 SEASONAL ALLERGIC RHINITIS, UNSPECIFIED TRIGGER: ICD-10-CM

## 2025-05-12 PROCEDURE — 99213 OFFICE O/P EST LOW 20 MIN: CPT | Performed by: INTERNAL MEDICINE

## 2025-05-12 PROCEDURE — 1125F AMNT PAIN NOTED PAIN PRSNT: CPT | Performed by: INTERNAL MEDICINE

## 2025-05-12 PROCEDURE — G2211 COMPLEX E/M VISIT ADD ON: HCPCS | Performed by: INTERNAL MEDICINE

## 2025-05-12 PROCEDURE — 3077F SYST BP >= 140 MM HG: CPT | Performed by: INTERNAL MEDICINE

## 2025-05-12 PROCEDURE — 3080F DIAST BP >= 90 MM HG: CPT | Performed by: INTERNAL MEDICINE

## 2025-05-12 RX ORDER — LORATADINE 10 MG/1
10 TABLET ORAL DAILY
COMMUNITY

## 2025-05-12 ASSESSMENT — PAIN SCALES - GENERAL: PAINLEVEL_OUTOF10: MODERATE PAIN (6)

## 2025-05-12 NOTE — PROGRESS NOTES
[facility] INTERNAL MEDICINE NOTE    Emerald Kulkarni is a 80 year old female who presents to clinic today for the following health issues:    Musculoskeletal Pain  Duration of complaint: May 4, 2025   Description:   Location: left hip  Character: Dull ache  Intensity: mild  Progression of Symptoms: better  Accompanying Signs & Symptoms: Other symptoms: none  History: Previous similar pain: YES    Precipitating factors: Trauma. Patient fell clumsily on her basement floor while wearing her's 's shoes, dislocating her left hip.  Alleviating factors: Improved by: rest/inactivity  Therapies Tried and outcome: none       Patient Active Problem List   Diagnosis    Psoriasis    Hx of melanoma excision    Chronic constipation    History of diverticulitis    Left renal mass    Heart murmur: Benign ?AS    Morbid obesity (H)    Complex renal cyst    Urinary urgency    Sigmoid diverticulitis    Primary osteoarthritis of left hand    PAD (peripheral artery disease)    Osteoarthrosis    MIRELLA (obstructive sleep apnea)    Memory impairment    Left lumbar radiculopathy    Intermittent small bowel obstruction due to adhesions (H)    Hyperlipidemia    Hearing loss    Gastroesophageal reflux disease without esophagitis    Diverticulosis of colon    Displacement of lumbar intervertebral disc without myelopathy    COVID-19    CAD (coronary artery disease)    Chronic obstructive pulmonary disease (H)    Anemia    Age-related macular degeneration    Acute lower gastrointestinal bleeding    Acute left-sided low back pain without sciatica    Anginal equivalent    Aortic valve stenosis, etiology of cardiac valve disease unspecified     History reviewed. No pertinent surgical history.    Social History     Tobacco Use    Smoking status: Never     Passive exposure: Never    Smokeless tobacco: Never   Substance Use Topics    Alcohol use: Not on file     Family History  "  Problem Relation Age of Onset    Breast Cancer Mother     Breast Cancer Brother          Allergies   Allergen Reactions    Latex      Got a blister on leg, maybe from catheter?    Latex Other (See Comments)     Other reaction(s): Blisters    Tramadol Other (See Comments)     Other reaction(s): Abdominal pain       Recent Labs   Lab Test 12/02/24  1712 09/09/24  1634 12/07/22  1030 10/20/22  1321 08/04/21  1044   A1C  --  5.6  --   --   --    LDL  --   --  128*  --  171*   HDL  --   --  87  --  73   TRIG  --   --  118  --  113   ALT  --  21 33  --  37   CR  --  0.69 0.62  --  0.69   GFRESTIMATED  --  87 90  --  84   POTASSIUM  --  4.1 4.2  --  4.7   TSH 1.33  --   --  1.77 2.46      BP Readings from Last 3 Encounters:   05/12/25 (!) 142/92   05/04/25 (!) 166/91   03/24/25 (!) 170/104    Wt Readings from Last 3 Encounters:   05/12/25 84.4 kg (186 lb)   05/04/25 86.2 kg (190 lb)   03/24/25 87 kg (191 lb 12.8 oz)                    ROS:  C: NEGATIVE for fever, chills, change in weight  I: NEGATIVE for worrisome rashes, moles or lesions  E: NEGATIVE for vision changes or irritation  E/M: NEGATIVE for ear, mouth and throat problems  R: NEGATIVE for significant cough or SOB  B: NEGATIVE for masses, tenderness or discharge  CV: NEGATIVE for chest pain, palpitations or peripheral edema  GI: NEGATIVE for nausea, abdominal pain, heartburn, or change in bowel habits  : NEGATIVE for frequency, dysuria, or hematuria  M: NEGATIVE for significant arthralgias or myalgia  N: NEGATIVE for weakness, dizziness or paresthesias  E: NEGATIVE for temperature intolerance, skin/hair changes  H: NEGATIVE for bleeding problems  P: NEGATIVE for changes in mood or affect    OBJECTIVE:                                                    BP (!) 142/92 (BP Location: Right arm, Patient Position: Sitting, Cuff Size: Adult Regular)   Pulse 100   Temp 99  F (37.2  C) (Temporal)   Resp 20   Ht 1.6 m (5' 3\")   Wt 84.4 kg (186 lb)   SpO2 93%   " BMI 32.95 kg/m    Body mass index is 32.95 kg/m .  GENERAL: alert and no distress  EYES: Eyes grossly normal to inspection, PERRL and conjunctivae and sclerae normal  HENT: ear canals and TM's normal, nose and mouth without ulcers or lesions  NECK: no adenopathy, no asymmetry, masses, or scars  RESP: lungs clear to auscultation - no rales, rhonchi or wheezes  CV: regular rates and rhythm, normal S1 S2, no S3 or S4, no murmur, click or rub, peripheral pulses strong, and no peripheral edema  MS: no gross musculoskeletal defects noted, no edema  SKIN: no suspicious lesions or rashes  NEURO: Normal strength and tone, mentation intact and speech normal  PSYCH: mentation appears normal, affect normal/bright    Diagnostic Test Results:  No results found for any visits on 05/12/25.     ASSESSMENT/PLAN:                                                      Anterior dislocation of left hip, subsequent encounter  Condition resolving without complications such as fractures. Recommend physical therapy if condition recurs. If left hip pain occurs, then I recommend bone scan and refer to Orthopedics.     Disposition:  Follow-up in 7 months for annual physical exam.    Cristhian Vera MD  [facility]

## 2025-05-12 NOTE — PATIENT INSTRUCTIONS
At [facility], we strive to deliver an exceptional experience to you, every time we see you. If you receive a survey, please let us know what we are doing well and/or what we could improve upon, as we do value your feedback.  If you have MyChart, you can expect to receive results automatically within 24 hours of their completion.  Your provider will send a note interpreting your results as well.   If you do not have MyChart, you should receive your results in about a week by mail.    Your care team:                            Family Medicine Internal Medicine   MD Cristhian Prasad, MD Rossi Luis, MD Jovanny Garibay, MD Jasmyne Do, PA-C    Anton Gandhi, MD Pediatrics   Sherry Stokes, MD Mary Lou Hanson, CASS Ba CNP Gabrielle Wheeler, MD Albina Massey, MD Ema Gaming, CNP     Lay Aponte, PA-C Same-Day Provider (No follow-up visits)   CASS Trujillo, MAYA Sabillon, PA-C    Cheryl Weinstein PA-C     Clinic hours: Monday - Thursday 7 am-6 pm; Fridays 7 am-5 pm.   Urgent care: Monday - Friday 10 am- 8 pm; Saturday and Sunday 9 am-5 pm.    Clinic: [phone]       [facility]: Monday - Thursday 8 am - 7 pm; Friday 8 am - 6 pm  [facility]: [phone]

## 2025-05-19 ENCOUNTER — TRANSFERRED RECORDS (OUTPATIENT)
Dept: HEALTH INFORMATION MANAGEMENT | Facility: CLINIC | Age: 80
End: 2025-05-19
Payer: MEDICARE

## (undated) RX ORDER — BUPIVACAINE HYDROCHLORIDE 2.5 MG/ML
INJECTION, SOLUTION EPIDURAL; INFILTRATION; INTRACAUDAL
Status: DISPENSED
Start: 2024-12-30